# Patient Record
Sex: FEMALE | Race: WHITE
[De-identification: names, ages, dates, MRNs, and addresses within clinical notes are randomized per-mention and may not be internally consistent; named-entity substitution may affect disease eponyms.]

---

## 2017-09-08 ENCOUNTER — HOSPITAL ENCOUNTER (OUTPATIENT)
Dept: HOSPITAL 75 - RAD | Age: 52
End: 2017-09-08
Attending: FAMILY MEDICINE
Payer: COMMERCIAL

## 2017-09-08 DIAGNOSIS — M25.572: Primary | ICD-10-CM

## 2017-09-08 DIAGNOSIS — M79.672: Primary | ICD-10-CM

## 2017-09-08 PROCEDURE — 73610 X-RAY EXAM OF ANKLE: CPT

## 2017-09-08 PROCEDURE — 73620 X-RAY EXAM OF FOOT: CPT

## 2017-09-08 NOTE — DIAGNOSTIC IMAGING REPORT
INDICATION: Left foot injury.



AP and lateral views of the left foot show no fracture,

dislocation, or other acute abnormalities.



IMPRESSION: Negative left foot.



Dictated by: 



  Dictated on workstation # [id number]

## 2017-09-08 NOTE — DIAGNOSTIC IMAGING REPORT
INDICATION: Left ankle pain.



3 views of the left ankle show no fracture, dislocation, or other

acute abnormalities.



IMPRESSION: Negative left ankle.



Dictated by: 



  Dictated on workstation # [id number]

## 2017-10-06 ENCOUNTER — HOSPITAL ENCOUNTER (OUTPATIENT)
Dept: HOSPITAL 75 - RAD | Age: 52
End: 2017-10-06
Attending: FAMILY MEDICINE
Payer: COMMERCIAL

## 2017-10-06 DIAGNOSIS — N63.11: Primary | ICD-10-CM

## 2017-10-06 PROCEDURE — 77066 DX MAMMO INCL CAD BI: CPT

## 2017-10-06 NOTE — DIAGNOSTIC IMAGING REPORT
Bilateral CC and right MLO projections diagnostic mammogram.



INDICATION: Area of fullness in the central lateral aspect of the

right breast and in the axillary region.



The current study was also evaluated with a Computer Aided

Detection (CAD) system.



COMPARISON: 11/12/13.



FINDINGS:

There are bilateral retropectoral implants seen in a symmetric

fashion. There is no mass, architectural distortion or suspicious

cluster of calcification seen.



IMPRESSION: No mammographic evidence of malignancy. Ultrasound

evaluation pending. 



ACR BI-RADS Category 0: Incomplete. (Needs additional imaging

evaluation).

Result letter will be mailed to the patient.

Note: At least 10% of breast cancer is not imaged by mammography.



Dictated by: 



  Dictated on workstation # [id number]

## 2017-10-06 NOTE — DIAGNOSTIC IMAGING REPORT
Right breast ultrasound.



INDICATION: Thickening in the axilla and outer aspect of the

right breast.



FINDINGS: The area of thickening in the right breast at 9 o'clock

zone, 3 cm from the nipple area corresponds to a hypoechoic

nonspecific lesion with circumscribed margins and no internal

vascularity seen measuring 8 x 3 x 7 mm abutting the outer

surface of the implant. Etiology is uncertain. However, the

overall appearance and smooth margins is in favor of a benign

etiology and could possibly related to area of scarring after the

mastectomy and implant placement. The area of thickening in the

axilla was also scanned with no underlying abnormality.



IMPRESSION: Nonspecific 8 mm smoothly marginated hyperechoic

lesion corresponding to the palpable area in the outer aspect of

the right breast is favored to be benign, possibly a postsurgical

scar. Followup with ultrasound in 6 months is recommended to

ensure no adverse development. 



ACR BI-RADS Category 3: Probably benign findings.



Dictated by: 



  Dictated on workstation # [id number]

## 2017-10-27 ENCOUNTER — HOSPITAL ENCOUNTER (OUTPATIENT)
Dept: HOSPITAL 75 - RAD | Age: 52
End: 2017-10-27
Attending: FAMILY MEDICINE
Payer: COMMERCIAL

## 2017-10-27 DIAGNOSIS — N36.9: Primary | ICD-10-CM

## 2017-10-27 DIAGNOSIS — K40.90: ICD-10-CM

## 2017-10-27 PROCEDURE — 74177 CT ABD & PELVIS W/CONTRAST: CPT

## 2017-10-27 NOTE — DIAGNOSTIC IMAGING REPORT
PROCEDURE: CT abdomen and pelvis with contrast.



TECHNIQUE: Multiple contiguous axial images were obtained through

the abdomen and pelvis after administration of intravenous

contrast. 



INDICATION:  Hematuria. Pelvic pain.



100 mL of Omnipaque 350 is administered intravenously.



FINDINGS:

The lung bases appear clear. Breast implants are also partially

visualized.



The liver demonstrates diffuse steatosis with no focal mass

identified. The spleen is not enlarged. Cholecystectomy clips are

seen. The adrenal glands and the pancreas appear unremarkable.

The kidneys have symmetric enhancement and excretion. There is no

hydronephrosis. The urinary bladder appears unremarkable.



There is no significant free fluid or fluid collection in the

abdomen or pelvis noted. There is suggestion of hysterectomy.

Correlate with surgical history. There are prominent densities in

the urethra. These could relate to a urethral diverticula

containing stones. The appearance is similar to study from

4/18/2013.



The abdominal aorta is normal in caliber. No para-aortic

significantly enlarged lymph node is seen.



No bowel obstruction. A few small diverticula are seen in the

sigmoid colon with no evidence of diverticulitis. The appendix is

not seen on this exam.



There is a tiny indirect left-sided fat-containing inguinal

hernia.



The osseous structures appear grossly unremarkable.



IMPRESSION:

1. High-density material is surrounding the urethra similar to

2013 exam. These could relate to stones within urethral

diverticulum or possibly sequela to prior injury or surgery to

the urethra. Correlate with history.

2. Tiny fat-containing indirect left inguinal hernia.



Dictated by: 



  Dictated on workstation # [id number]

## 2017-11-15 ENCOUNTER — HOSPITAL ENCOUNTER (OUTPATIENT)
Dept: HOSPITAL 75 - RAD | Age: 52
End: 2017-11-15
Attending: UROLOGY
Payer: COMMERCIAL

## 2017-11-15 DIAGNOSIS — N20.0: Primary | ICD-10-CM

## 2017-11-15 PROCEDURE — 74000: CPT

## 2017-11-15 NOTE — DIAGNOSTIC IMAGING REPORT
INDICATION: Left renal calculus.



COMPARISON: CT dated 10/27/2017.



FINDINGS: Two frontal radiographic views of the abdomen were

obtained. Small bowel loops are nondistended. There is no large

collection of free intraperitoneal air. No unexpected radiopaque

foreign bodies are seen. There are bulky calcifications

projecting over the lower pelvis, midline. This corresponds to

dystrophic calcifications seen on previously performed CT.

Otherwise, no unexpected calculi are identified. Bony structures

show no gross acute abnormalities.



IMPRESSION:

1. Nonobstructive small bowel gas pattern.



Dictated by: 



  Dictated on workstation # [id number]

## 2018-02-12 ENCOUNTER — HOSPITAL ENCOUNTER (OUTPATIENT)
Dept: HOSPITAL 75 - RAD | Age: 53
End: 2018-02-12
Attending: NURSE PRACTITIONER
Payer: COMMERCIAL

## 2018-02-12 DIAGNOSIS — R05: Primary | ICD-10-CM

## 2018-02-12 PROCEDURE — 71046 X-RAY EXAM CHEST 2 VIEWS: CPT

## 2018-02-12 NOTE — DIAGNOSTIC IMAGING REPORT
CLINICAL INDICATION: Patient with cough for past month.



EXAM: Chest x-ray PA and lateral views.



COMPARISONS: Chest x-ray dated 01/23/2014.



FINDINGS:



Lungs/pleura: Lungs are clear. There is no pneumothorax. There is

no pleural effusion.



Mediastinum: Unremarkable. 



Pulmonary vasculature: Unremarkable.



Heart: Unremarkable.



Bones/extrathoracic soft tissue: Unremarkable.



IMPRESSION:

There is no radiographic evidence of acute cardiopulmonary

process.



Dictated by: 



  Dictated on workstation # [id number]

## 2018-02-26 ENCOUNTER — HOSPITAL ENCOUNTER (OUTPATIENT)
Dept: HOSPITAL 75 - RAD | Age: 53
End: 2018-02-26
Attending: FAMILY MEDICINE
Payer: COMMERCIAL

## 2018-02-26 DIAGNOSIS — K22.4: ICD-10-CM

## 2018-02-26 DIAGNOSIS — K21.9: Primary | ICD-10-CM

## 2018-02-26 PROCEDURE — 74240 X-RAY XM UPR GI TRC 1CNTRST: CPT

## 2018-02-26 NOTE — DIAGNOSTIC IMAGING REPORT
INDICATION: 

Gastroesophageal reflux disease.



TECHNIQUE: 

The patient ingested effervescent crystals as well as thin and

thick barium and imaging of the esophagus, stomach, and proximal

small bowel was performed. 1 minute and 50 seconds of fluoroscopy

time was utilized.



FINDINGS:

The preprocedure chest is unremarkable. The heart size is normal.

The lungs are clear. The esophagus has a smooth contour. No mass

or stricture is identified. No gastroesophageal reflux is

demonstrated. No significant hiatal hernia is seen. There is some

mild generalized esophageal dysmotility. The stomach has a normal

configuration. There is prompt emptying into the small bowel. The

duodenal bulb is without deformity.



IMPRESSION: 

Generalized esophageal dysmotility. No other significant

abnormality is seen.



Dictated by: 



  Dictated on workstation # [id number]

## 2018-04-12 ENCOUNTER — HOSPITAL ENCOUNTER (OUTPATIENT)
Dept: HOSPITAL 75 - RAD | Age: 53
End: 2018-04-12
Attending: FAMILY MEDICINE
Payer: COMMERCIAL

## 2018-04-12 DIAGNOSIS — N64.59: ICD-10-CM

## 2018-04-12 DIAGNOSIS — Z98.82: ICD-10-CM

## 2018-04-12 DIAGNOSIS — N63.11: Primary | ICD-10-CM

## 2018-04-12 NOTE — DIAGNOSTIC IMAGING REPORT
INDICATION: Rash on the right breast.



Comparison is made with prior exam from 10/06/2017 and

11/12/2013.



The current study was also evaluated with a Computer Aided

Detection (CAD) system.



Subpectoral right breast implant is noted. Implant contours

remain smooth. No definite evidence of extracapsular rupture is

seen. No mass or malignant appearing microcalcifications are

identified.



IMPRESSION: BI-RADS zero



Stable right mammogram. No suspicious abnormality is seen.

Patient is scheduled to undergo right breast ultrasound to

further evaluate previously noted nodule at the 9 o'clock

location.



ACR BI-RADS Category 0: Incomplete. (Needs additional imaging

evaluation).

Result letter will be mailed to the patient.

Note: At least 10% of breast cancer is not imaged by mammography.



Dictated by: 



  Dictated on workstation # [id number]

## 2018-04-12 NOTE — DIAGNOSTIC IMAGING REPORT
INDICATION: Right breast lump. Patient presents for six-month

followup.



Correlation is made with prior exam from 10/06/2017.



Sonographic interrogation of the 9 o'clock location of the right

breast, 3 cm from the nipple was performed. The mixed

echogenicity ovoid area adjacent to the implant remains stable at

7 mm x 3 mm x 8 mm. This remains indeterminate but most likely

represents scarring. No suspicious features are seen.



IMPRESSION: BI-RADS category 2



Stable right breast ultrasound. Patient should return in 6 months

for bilateral screening mammography.



ACR BI-RADS Category 2: Benign findings.



Dictated by: 



  Dictated on workstation # [id number]

## 2018-11-07 ENCOUNTER — HOSPITAL ENCOUNTER (OUTPATIENT)
Dept: HOSPITAL 75 - PREOP | Age: 53
Discharge: HOME | End: 2018-11-07
Attending: SURGERY
Payer: COMMERCIAL

## 2018-11-07 VITALS — WEIGHT: 169 LBS | HEIGHT: 62 IN | BODY MASS INDEX: 31.1 KG/M2

## 2018-11-07 DIAGNOSIS — Z01.818: Primary | ICD-10-CM

## 2018-11-14 ENCOUNTER — HOSPITAL ENCOUNTER (OUTPATIENT)
Dept: HOSPITAL 75 - ENDO | Age: 53
Discharge: HOME | End: 2018-11-14
Attending: SURGERY
Payer: COMMERCIAL

## 2018-11-14 VITALS — SYSTOLIC BLOOD PRESSURE: 125 MMHG | DIASTOLIC BLOOD PRESSURE: 75 MMHG

## 2018-11-14 VITALS — WEIGHT: 169 LBS | HEIGHT: 62 IN | BODY MASS INDEX: 31.1 KG/M2

## 2018-11-14 VITALS — SYSTOLIC BLOOD PRESSURE: 111 MMHG | DIASTOLIC BLOOD PRESSURE: 62 MMHG

## 2018-11-14 VITALS — SYSTOLIC BLOOD PRESSURE: 117 MMHG | DIASTOLIC BLOOD PRESSURE: 68 MMHG

## 2018-11-14 DIAGNOSIS — K21.0: ICD-10-CM

## 2018-11-14 DIAGNOSIS — K64.1: ICD-10-CM

## 2018-11-14 DIAGNOSIS — Z86.718: ICD-10-CM

## 2018-11-14 DIAGNOSIS — Z79.82: ICD-10-CM

## 2018-11-14 DIAGNOSIS — K44.9: ICD-10-CM

## 2018-11-14 DIAGNOSIS — Z12.11: Primary | ICD-10-CM

## 2018-11-14 DIAGNOSIS — Z79.899: ICD-10-CM

## 2018-11-14 DIAGNOSIS — K29.70: ICD-10-CM

## 2018-11-14 DIAGNOSIS — E06.3: ICD-10-CM

## 2018-11-14 DIAGNOSIS — E78.00: ICD-10-CM

## 2018-11-14 RX ADMIN — SODIUM CHLORIDE PRN MLS/HR: 900 INJECTION, SOLUTION INTRAVENOUS at 11:20

## 2018-11-14 RX ADMIN — SODIUM CHLORIDE PRN MLS/HR: 900 INJECTION, SOLUTION INTRAVENOUS at 09:40

## 2018-11-14 NOTE — PROGRESS NOTE-POST OPERATIVE
Post-Operative Progess Note


Surgeon (s)/Assistant (s)


Surgeon


MARINO KENNEDY MD


Assistant:  none





Pre-Operative Diagnosis


GERD, dysphagia, screening colonoscopy





Post-Operative Diagnosis





reflux esophagitis(stage 2), small-moderate hiatal hernia(2-2.5cm), mild


gastritis.


chronic stage 2 ext and int hemorrhoids.





Procedure & Operative Findings


Date of Procedure


11/14/18


Procedure Performed/Findings


EGD with bx.


Colonoscopy.


Anesthesia Type


CS





Estimated Blood Loss


Estimated blood loss (mL):  minimal





Specimens/Packing


Specimens Removed


GE jxn, antrum











MARINO KENNEDY MD Nov 14, 2018 11:55 am

## 2018-11-14 NOTE — PROGRESS NOTE-PRE OPERATIVE
Pre-Operative Progress Note


H&P Reviewed


The H&P was reviewed, patient examined and no changes noted.


Date Seen by Provider:  Nov 14, 2018


Time Seen by Provider:  10:00


Date H&P Reviewed:  Nov 14, 2018


Time H&P Reviewed:  10:00


Pre-Operative Diagnosis:  GERD, dysphagia, screening colonoscopy











MARINO KENNEDY MD Nov 14, 2018 11:53 am

## 2018-11-14 NOTE — OPERATIVE REPORT
DATE OF SERVICE:  11/14/2018



ATTENDING PHYSICIAN:

Michael Díaz MD.



PREOPERATIVE DIAGNOSES:

Gastroesophageal reflux disease, dysphagia, and screening colonoscopy.



POSTOPERATIVE DIAGNOSES:

Reflux esophagitis, stage II; small to moderate size hiatal hernia,

approximately 2 cm to 2.5 cm in size; and mild gastritis.  No distal

obstructions.  Chronic stage II external and internal hemorrhoids.  Remainder of

the rectum and colon were normal.



PROCEDURES PERFORMED:

Esophagogastroduodenoscopy with biopsy, colonoscopy.



SURGEON:

Marino Kennedy MD.



ANESTHESIA:

Conscious sedation.



ESTIMATED BLOOD LOSS:

Minimal.



FINDINGS:

1.  EGD, reflux esophagitis, stage II, no ulcers or strictures, small to

moderate size hiatal hernia, 2 cm to 2.5 cm in size.  There was a mild severity

gastritis.  No formal ulcerations, polyps or any neoplasms.  Pylorus and

duodenum appeared normal with no distal obstructions.

2.  Colonoscopy, chronic stage II external and internal hemorrhoids, not

actively edematous nor inflamed and no bleeding.  The remainder of the rectum

and colon were normal.



DISPOSITION:

The patient tolerated the procedure well.



INDICATIONS:

The patient is a 53-year-old female with epigastric pain as well as dysphagia. 

She reports that she has had a history of crampy pain after taking in a food

bolus and then would feel a pressure sensation and this would eventually resolve

on its own.  She does not report any regurgitation or jose guadalupe episodes of nausea,

no vomiting.  She states that this was initially very infrequent; however, has

become more frequent over time.  She also has noticed one episode of dark stools

in 04/2018, which resolved on its own.  She has not had a colonoscopy up to this

point in her life.  She reports for the most part, she is having normal bowel

movements and does not report any red blood per rectum.



DESCRIPTION OF PROCEDURE:

The patient was brought to the operating room, laid supine on the table.  After

adequate IV pain and sedating medications and conscious sedation anesthesia, the

mouthpiece was applied.



The endoscope was placed in the mouth, visualizing the pharynx and

hypopharyngeal region.  Vocal cords, epiglottis and vallecula identified and

appeared to be normal.  The endoscope was then gently intubated in the

esophageal opening and esophagus insufflated.  The endoscope was then advanced

through the first, second and third portion of the esophagus at the level of GE

junction, a reflux esophagitis, stage II identified.  There were no ulcerations

or strictures identified in this region.  The GE junction appeared to be

intrathoracic consistent with a hiatal hernia as well.



The endoscope was then advanced in the stomach and endoscope retroflexed,

visualizing a hiatal hernia, which was small to moderate size approximately 2 cm

to 2.5 cm in size.  A mild severity gastritis was noted.  There were no formal

ulcerations, polyps or any neoplasms.  A biopsy was taken of the stomach antrum

with forceps with visualization of good hemostasis.  The endoscope was then

advanced to the pylorus and the first and second portion of the duodenum, which

appeared normal.  No distal obstructions.



The patient tolerated this portion of the procedure well.  We will await the

biopsy results and await medical management.  We feel that the etiology of her

dysphagia is secondary to the hiatal hernia.  If she continues to be

symptomatic, we will recommend further evaluation for possible hiatal hernia

repair, which would encompass an esophageal manometry study to rule out any

esophageal dysmotility disorders.  In the meantime, we will continue with an

acid reduction therapy with Protonix and have her take in small and more

frequent meals, avoidance of eating at night as well as head elevation while

lying supine and she also needs to avoid caffeinated beverages, spicy, greasy

and acidic foods.



Under the same anesthesia, we then proceeded with the colonoscopy portion of

procedure.  A digital rectal examination was performed, which revealed chronic

stage II external and internal hemorrhoids, not actively edematous nor inflamed

and no bleeding.  Normal sphincter tone was felt and there were no palpable

masses.



The endoscope was then intubated into the anus and rectum gently insufflated. 

The endoscope was then advanced to the valves of [place] of the rectum with no

polyps or any neoplasms identified.  We then proceeded through the sigmoid colon

where no diverticulosis identified.  The endoscope was then advanced through the

remainder of the descending, transverse and ascending colon to the cecum.  These

segments were normal as well.  There were no polyps or any neoplasms identified.

 The endoscope was then slowly withdrawn while taking a second look and

suctioning of residual air with no additional findings.



The patient tolerated the procedure well.  We will recommend continued medical

management with a high fiber diet with at least 25 grams of fiber per day as

well as 64 fluid ounces of water daily to promote soft stools on a daily basis. 

She does not need another colonoscopy for another 10 years.





Job ID: [id number]

DocumentID: [id number]

Dictated Date:  11/14/2018 11:50:30

Transcription Date: 11/14/2018 16:16:35

Dictated By: MARINO KENNEDY MD

## 2018-11-14 NOTE — DISCHARGE INST-SURGICAL
D/C Lap Instructions-KIDO


New, Converted, or Re-Newed RX:  RX on Chart


Follow Up Appt in 2 weeks





Activity as tolerated





High Fiber Diet 25g or more per day





Avoid Alcohol, Caffeine, Spicy Greasy and Acid foods.





Drink 64 fluid oz or more of fluids per day.





Symptoms to Report: Fever over 101 degree F, Nausea/Vomiting 


If any problems/questions: Contact your physician or go to Emergency Room











MARINO KENNEDY MD Nov 14, 2018 11:57 am

## 2018-11-14 NOTE — CONSCIOUS SEDATION/ASA
Conscious Sedation Pre-Proced


Time


10:00





ASA Score


2


For ASA 3 and 4: Consider anesthesia and medical clearance. Also, for 

patients with a history of failed moderate sedation consider anesthesia.

















Airway 


 


Lungs 


 


Heart 


 


 ASA score


 


 ASA 1: a normal healthy patient


 


 ASA 2:  a patient with a mild systemic disease (mid diabetes, controlled 

hypertension, obesity 


 


 ASA 3:  a patient with a severe systemic disease that limits activity  (angina

, COPD, prior Myocardial infarction)


 


 ASA 4:  a patient with an incapacitating disease that is a constant threat to 

life (CHF, renal failure)


 


 ASA 5:  a moribund patient not expected to survive 24 hrs.  (ruptured aneurysm)


 


 ASA 6:  a declared brain dead patient whose organs are being harvested.


 


 For emergent operations, add the letter E after the classification











Mallampati Classification


Grade 2





Sedation Plan


Analgesia, Amnesia, Plan communicated to team members, Discussed options with 

patient/fam, Discussed risks with patient/fam


The patient is an appropriate candidate to undergo the planned procedure, 

sedation, and anesthesia.





The patient immediately re-assessed prior to indication.











MARINO KENNEDY MD Nov 14, 2018 11:52 am

## 2018-12-20 ENCOUNTER — HOSPITAL ENCOUNTER (OUTPATIENT)
Dept: HOSPITAL 75 - PREOP | Age: 53
Discharge: HOME | End: 2018-12-20
Attending: SURGERY
Payer: COMMERCIAL

## 2018-12-20 VITALS — HEIGHT: 62 IN | BODY MASS INDEX: 31.1 KG/M2 | WEIGHT: 169 LBS

## 2018-12-20 DIAGNOSIS — Z01.818: Primary | ICD-10-CM

## 2018-12-21 ENCOUNTER — HOSPITAL ENCOUNTER (OUTPATIENT)
Dept: HOSPITAL 75 - SDC | Age: 53
Discharge: HOME | End: 2018-12-21
Attending: SURGERY
Payer: COMMERCIAL

## 2018-12-21 VITALS — DIASTOLIC BLOOD PRESSURE: 60 MMHG | SYSTOLIC BLOOD PRESSURE: 107 MMHG

## 2018-12-21 VITALS — BODY MASS INDEX: 31.18 KG/M2 | HEIGHT: 62 IN | WEIGHT: 169.44 LBS

## 2018-12-21 VITALS — DIASTOLIC BLOOD PRESSURE: 62 MMHG | SYSTOLIC BLOOD PRESSURE: 106 MMHG

## 2018-12-21 VITALS — SYSTOLIC BLOOD PRESSURE: 99 MMHG | DIASTOLIC BLOOD PRESSURE: 57 MMHG

## 2018-12-21 VITALS — SYSTOLIC BLOOD PRESSURE: 134 MMHG | DIASTOLIC BLOOD PRESSURE: 72 MMHG

## 2018-12-21 DIAGNOSIS — Z79.82: ICD-10-CM

## 2018-12-21 DIAGNOSIS — F41.9: ICD-10-CM

## 2018-12-21 DIAGNOSIS — G57.92: ICD-10-CM

## 2018-12-21 DIAGNOSIS — K22.0: ICD-10-CM

## 2018-12-21 DIAGNOSIS — K44.9: ICD-10-CM

## 2018-12-21 DIAGNOSIS — K29.70: ICD-10-CM

## 2018-12-21 DIAGNOSIS — K21.0: ICD-10-CM

## 2018-12-21 DIAGNOSIS — Z79.899: ICD-10-CM

## 2018-12-21 DIAGNOSIS — K43.2: ICD-10-CM

## 2018-12-21 DIAGNOSIS — E06.3: ICD-10-CM

## 2018-12-21 DIAGNOSIS — K40.90: Primary | ICD-10-CM

## 2018-12-21 LAB
BASOPHILS # BLD AUTO: 0 10^3/UL (ref 0–0.1)
BASOPHILS NFR BLD AUTO: 0 % (ref 0–10)
EOSINOPHIL # BLD AUTO: 0.1 10^3/UL (ref 0–0.3)
EOSINOPHIL NFR BLD AUTO: 1 % (ref 0–10)
ERYTHROCYTE [DISTWIDTH] IN BLOOD BY AUTOMATED COUNT: 13.9 % (ref 10–14.5)
HCT VFR BLD CALC: 38 % (ref 35–52)
HGB BLD-MCNC: 12.7 G/DL (ref 11.5–16)
LYMPHOCYTES # BLD AUTO: 1.9 X 10^3 (ref 1–4)
LYMPHOCYTES NFR BLD AUTO: 37 % (ref 12–44)
MANUAL DIFFERENTIAL PERFORMED BLD QL: NO
MCH RBC QN AUTO: 30 PG (ref 25–34)
MCHC RBC AUTO-ENTMCNC: 33 G/DL (ref 32–36)
MCV RBC AUTO: 90 FL (ref 80–99)
MONOCYTES # BLD AUTO: 0.5 X 10^3 (ref 0–1)
MONOCYTES NFR BLD AUTO: 10 % (ref 0–12)
NEUTROPHILS # BLD AUTO: 2.6 X 10^3 (ref 1.8–7.8)
NEUTROPHILS NFR BLD AUTO: 52 % (ref 42–75)
PLATELET # BLD: 284 10^3/UL (ref 130–400)
PMV BLD AUTO: 10.2 FL (ref 7.4–10.4)
RBC # BLD AUTO: 4.24 10^6/UL (ref 4.35–5.85)
WBC # BLD AUTO: 5.1 10^3/UL (ref 4.3–11)

## 2018-12-21 PROCEDURE — 88112 CYTOPATH CELL ENHANCE TECH: CPT

## 2018-12-21 PROCEDURE — 85025 COMPLETE CBC W/AUTO DIFF WBC: CPT

## 2018-12-21 PROCEDURE — 36415 COLL VENOUS BLD VENIPUNCTURE: CPT

## 2018-12-21 PROCEDURE — 94664 DEMO&/EVAL PT USE INHALER: CPT

## 2018-12-21 PROCEDURE — 87081 CULTURE SCREEN ONLY: CPT

## 2018-12-21 PROCEDURE — 88305 TISSUE EXAM BY PATHOLOGIST: CPT

## 2018-12-21 RX ADMIN — SODIUM CHLORIDE, SODIUM LACTATE, POTASSIUM CHLORIDE, AND CALCIUM CHLORIDE PRN MLS/HR: 600; 310; 30; 20 INJECTION, SOLUTION INTRAVENOUS at 11:30

## 2018-12-21 RX ADMIN — SODIUM CHLORIDE, SODIUM LACTATE, POTASSIUM CHLORIDE, AND CALCIUM CHLORIDE PRN MLS/HR: 600; 310; 30; 20 INJECTION, SOLUTION INTRAVENOUS at 09:55

## 2018-12-21 NOTE — PROGRESS NOTE-PRE OPERATIVE
Pre-Operative Progress Note


H&P Reviewed


The H&P was reviewed, patient examined and no changes noted.


Date Seen by Provider:  Dec 21, 2018


Time Seen by Provider:  09:45


Date H&P Reviewed:  Dec 21, 2018


Time H&P Reviewed:  09:45


Pre-Operative Diagnosis:  reducible symptomatic incisional and left inguinal 

hernia.











MARINO KENNEDY MD Dec 21, 2018 09:48

## 2018-12-21 NOTE — CONSCIOUS SEDATION/ASA
Conscious Sedation Pre-Proced


Time


09:45





ASA Score


2


For ASA 3 and 4: Consider anesthesia and medical clearance. Also, for 

patients with a history of failed moderate sedation consider anesthesia.

















Airway 


 


Lungs 


 


Heart 


 


 ASA score


 


 ASA 1: a normal healthy patient


 


 ASA 2:  a patient with a mild systemic disease (mid diabetes, controlled 

hypertension, obesity 


 


 ASA 3:  a patient with a severe systemic disease that limits activity  (angina

, COPD, prior Myocardial infarction)


 


 ASA 4:  a patient with an incapacitating disease that is a constant threat to 

life (CHF, renal failure)


 


 ASA 5:  a moribund patient not expected to survive 24 hrs.  (ruptured aneurysm)


 


 ASA 6:  a declared brain dead patient whose organs are being harvested.


 


 For emergent operations, add the letter E after the classification











Mallampati Classification


Grade 2





Sedation Plan


Analgesia, Amnesia, Plan communicated to team members, Discussed options with 

patient/fam, Discussed risks with patient/fam


The patient is an appropriate candidate to undergo the planned procedure, 

sedation, and anesthesia.





The patient immediately re-assessed prior to indication.











MARINO KENNEDY MD Dec 21, 2018 09:45

## 2018-12-21 NOTE — PROGRESS NOTE-POST OPERATIVE
Post-Operative Progess Note


Surgeon (s)/Assistant (s)


Surgeon


MARINO KENNEDY MD


Assistant:  none





Pre-Operative Diagnosis


reducible symptomatic incisional and left inguinal hernia.





Post-Operative Diagnosis





same.





Procedure & Operative Findings


Date of Procedure


12/21/18


Procedure Performed/Findings


laparoscopic left indirect inguinal hernia repair with mesh.


incisional hernia repair.


EGD with bx and dilatation.


Anesthesia Type


GET





Estimated Blood Loss


Estimated blood loss (mL):  minimal





Specimens/Packing


Specimens Removed


GE jxn, antrum











MARINO KENNEDY MD Dec 21, 2018 12:38

## 2018-12-22 NOTE — OPERATIVE REPORT
DATE OF SERVICE:  12/21/2018



ATTENDING PRIMARY CARE PHYSICIAN:

Michael Díaz MD



PREOPERATIVE DIAGNOSES:

Symptomatic left inguinal hernia, symptomatic epigastric incisional hernia,

achalasia.



POSTOPERATIVE DIAGNOSES:

Small and indirect left inguinal hernia with preperitoneal fat within the hernia

sac.  Small incisional hernia from a previous trocar incision in the epigastric

region.  The ovaries appeared to be hypoplastic and solid in appearance.  An

intraoperative consultation was made with gynecology, which appeared to be

normal physiologic.  Reflux esophagitis stage II, achalasia, small hiatal

hernia, mild to moderate gastritis.



PROCEDURES:

1.  Laparoscopic left inguinal hernia repair with mesh.

2.  Laparoscopic ventral abdominal incisional hernia with mesh.

3.  EGD with biopsy.

4.  Balloon dilatation.



SURGEON:

Marino Kennedy MD



ANESTHESIA:

General endotracheal.



ESTIMATED BLOOD LOSS:

Minimal.



FINDINGS:

A small indirect left inguinal hernia with preperitoneal fat within the hernia

sac.  A small ventral abdominal incisional hernia from a previous trocar

placement.  Bilateral ovaries were apparent and appeared to be solid in

appearance.  An intraoperative consultation GYN was made, which appeared to be

normal physiologic changes.  Washings were sent for cytology.



DISPOSITION:

The patient tolerated the procedure well.



INDICATIONS:

The patient is a 53-year-old female with epigastric pain as well as dysphagia. 

She reports crampy abdominal pain after taking food bolus and fullness sensation
,

which were eventually resolved on its own.  This was initially infrequent;

however, worsened over time.  EGD was performed, which did show a hiatal hernia

approximately 2 cm in size as well as mild gastritis.  Biopsies were negative

for Flowers esophagus as well as H. pylori.  She eventually underwent an

esophageal manometry study, which showed normal waveform contractions of the

esophagus; however, increased tone of the lower esophageal sphincter for longer

periods of time more consistent with an achalasia.  She also has a symptomatic

left inguinal hernia, which is reducible; however, tender to palpation.  She

also reports of development of an incisional hernia in the epigastric region

from a previous trocar placement for laparoscopic surgery.  These were both

reducible; however, tender to palpation.



DESCRIPTION OF PROCEDURE:

The patient was brought to the operating room, laid supine on the table.  After

adequate IV pain and sedating medications, general endotracheal intubation, the

abdomen was prepped and draped in standard surgical fashion.



A 0.5% Marcaine with epinephrine was used to anesthetize the overlying skin in

the infraumbilical rim and a small transverse skin incision made using a #15

blade.  The abdominal wall was then retracted anteriorly and a Veress needle

inserted with low opening pressure of 0 mmHg and the abdomen was insufflated to

15 mmHg pressure.  The Veress needle removed and a 10 mm Xcel trocar placed

followed by a 10 mm 45-degree angle laparoscope visualizing the peritoneal

cavity.



A 4-quadrant abdominal exploration was performed.  There was a small left

inguinal hernia identified, which appeared to be an indirect hernia.  There was

no right hernia component identified.  There was a small incisional hernia

identified in the epigastric region as well.  The patient is status post

hysterectomy in 2006 and she has bilateral ovary cysts as well as tubes.  These

appeared to be white and solid in appearance and it was decided to proceed with

intraoperative consultation with GYN.  However, the report was that these were

normal physiologic changes for age.  Washings were sent for cytology in the

pelvis and bilateral ovaries.  We then proceeded to place bilateral 5 mm ports

under direct visualization after the skin and peritoneal lining were

anesthetized using 0.5% Marcaine with epinephrine and a transverse skin incision

made using a #15 blade.



The patient was then placed in Trendelenburg position.  Wedge of mesentery was

then opened using Sonicision and we first proceeded with lateral dissection

towards the conjoined tendon and inguinal ligament.  We then proceeded medially

towards the conjoint to the Rodrigo's ligament.  We then proceeded with inferior

dissection taking the hernia sac as well as the preperitoneal fat within the

defect.  Good hemostasis was observed and a medium sized Bard 3DMax

polypropylene mesh was placed through the 10 mm port and tacked to Rodrigo's

ligament medially and conjoint tendon laterally.  The peritoneal lining was then

placed over the mesh and a few tacks placed to hold it in place with

visualization of good hemostasis.



We then turned our attention to repair of the small ventral abdominal incisional

hernia in the epigastric region.  There was preperitoneal fat within the hernia

sac, which was then dissected out.  Through this defect, a 4.3 mm circular Bard

polypropylene mesh was placed and a few absorbable tacks placed into the fascia

to hold it into place.  Good hemostasis was observed.



The 10 mm port site fascia and peritoneum were then closed under direct

visualization using a Red-Gail device and 0 Vicryl suture.  The abdomen

was desufflated and the remaining ports removed.  All skin incisions were closed

using 4-0 Monocryl running subcuticular sutures.  Wounds were then cleaned and

covered with Dermabond.



We then proceeded with EGD and dilatation portion of the procedure.  The

mouthpiece was applied.  The endoscope was placed in the mouth, visualizing the

pharynx and hypopharyngeal region.  Vocal cords, epiglottis and vallecula

identified and appeared to be normal.  The endoscope was then gently intubated

at the esophageal opening and esophagus insufflated.  The endoscope was then

advanced to the first, second and third portion of the esophagus.  At the level

of the GE junction, a reflux esophagitis stage II identified.  There were no

ulcers or strictures identified in this region; however, there did appear to be

hypertonicity of the lower esophageal sphincter for an extended period of time

consistent with achalasia.  A biopsy was taken of the GE junction using forceps

with visualization of good hemostasis.



The endoscope was then easily advanced into the stomach and the endoscope

retroflexed again visualizing a small hiatal hernia.  This measurement was more

appropriately approximately 1.5 cm in size.  There was a mild to moderate

gastritis.  No ulcers, polyps or any neoplasms identified.  A biopsy was taken

of the antrum with visualization of good hemostasis.  The endoscope was then

advanced to the pylorus into the first and second portion of the duodenum, which

appeared normal with no distal obstructions.  



A balloon catheter was then placed near the GE junction and insufflated 

to 2 atmosphere of pressure with minimal resisitance. We then increased 

to 4 then 6 atmospheres with mild to moderate resistance then left this 

in place for 60 seconds. The balloon was then desufflated and removed with 

no bleeding nor mucocal tears. The endoscope was then slowly

withdrawn while taking a second look and suctioning of residual air with no

additional findings.



The patient tolerated the procedure well.  We will recommend the necessary

lifestyle and diet accommodation including small and more frequent meals,

avoidance of eating at night as well as head elevation while lying supine.  She

also needs to avoid caffeinated beverages, spicy, greasy and acidic foods.  If

she becomes symptomatic again, we will proceed with repeat dilatations.  We will

have followup in the office in approximately 2 weeks.





Job ID: [id number]

DocumentID: [id number]

Dictated Date:  12/21/2018 12:55:26

Transcription Date: 12/22/2018 00:25:11

Dictated By: MARINO KENNEDY MD

MTDD

## 2018-12-22 NOTE — CONSULTATION REPORT
DATE OF SERVICE:  12/21/2018



This is an intraoperative consult from Dr. Ronal Baum.



REASON FOR CONSULTATION:

Evaluate the patient's ovaries.



HISTORY OF PRESENT ILLNESS:

The patient is a 53-year-old white female patient of Dr. Villeda undergoing

laparoscopic surgery at his hands.  In the course of that surgery, he evaluated

her pelvis identifying the ovaries.  The ovaries were suspended well up on each

side of the pelvis with a portion of the fallopian tube obscuring complete

visualization of the ovary.  The portion of the ovary that was visible seemed 
to be

suspicious appearing.  I was consulted intraoperatively to evaluate the patient'
s

ovaries.



At the time of the consultation, which was shortly after 1 p.m. on this date,

the patient was asleep in the operating room with laparoscopic ports in place

with Dr. Villeda scrubbed in.  He was finished with his laparoscopic procedure and

had asked that I inspect this patient's ovaries.  He directed the laparoscope to

the patient's left ovary, which was partially covered by normal appearing

fallopian tube.  The ovary was well suspended up on the left pelvic brim. 

The ovary had a normal and somewhat atretic appearance.  There was no abnormal

appearing pathology visually.  The laparoscope was rotated to the right pelvic

side wall.  Likewise, the right ovary was adherent to the pelvic side wall from

its suspension at the time of this patient's hysterectomy.



This patient was well known to me as I had performed her hysterectomy.  
Hysterectomy was a total abdominal hysterectomy with conservation of both 
ovaries.  At the time of that surgery I would have supported the ovaries well 
up on the pelvic sidewall at or near the pelvic brim as was seen by Dr. Villeda.  
So the position of the ovaries was as to be expected. This patient at 53 is 
likely menopausal, which would be consistent with the atretic appearance of her 
ovaries.



Dr. Villeda had obtained pelvic washings and nothing further was warranted in 
regard to evaluation of the ovaries or the pelvis at this time.  Again, my 
recommendation was to leave the ovaries as is, as they appeared perfectly 
normal.





Job ID: [id number]

DocumentID: [id number]

Dictated Date:  12/22/2018 09:51:26

Transcription Date: 12/22/2018 10:54:05

Dictated By: JACKIE BOBO MD

MTDD

## 2019-04-22 ENCOUNTER — HOSPITAL ENCOUNTER (EMERGENCY)
Dept: HOSPITAL 75 - ER | Age: 54
Discharge: HOME | End: 2019-04-22
Payer: COMMERCIAL

## 2019-04-22 VITALS — SYSTOLIC BLOOD PRESSURE: 128 MMHG | DIASTOLIC BLOOD PRESSURE: 72 MMHG

## 2019-04-22 VITALS — WEIGHT: 170 LBS | HEIGHT: 62 IN | BODY MASS INDEX: 31.28 KG/M2

## 2019-04-22 DIAGNOSIS — Z90.710: ICD-10-CM

## 2019-04-22 DIAGNOSIS — G43.909: ICD-10-CM

## 2019-04-22 DIAGNOSIS — F32.9: ICD-10-CM

## 2019-04-22 DIAGNOSIS — E06.3: ICD-10-CM

## 2019-04-22 DIAGNOSIS — Z90.89: ICD-10-CM

## 2019-04-22 DIAGNOSIS — Z82.49: ICD-10-CM

## 2019-04-22 DIAGNOSIS — J45.909: ICD-10-CM

## 2019-04-22 DIAGNOSIS — Z90.13: ICD-10-CM

## 2019-04-22 DIAGNOSIS — N13.2: Primary | ICD-10-CM

## 2019-04-22 DIAGNOSIS — Z79.82: ICD-10-CM

## 2019-04-22 DIAGNOSIS — Z87.19: ICD-10-CM

## 2019-04-22 DIAGNOSIS — D64.9: ICD-10-CM

## 2019-04-22 DIAGNOSIS — Z88.0: ICD-10-CM

## 2019-04-22 DIAGNOSIS — Z96.653: ICD-10-CM

## 2019-04-22 DIAGNOSIS — F41.9: ICD-10-CM

## 2019-04-22 DIAGNOSIS — Z91.040: ICD-10-CM

## 2019-04-22 DIAGNOSIS — Z90.49: ICD-10-CM

## 2019-04-22 DIAGNOSIS — Z91.041: ICD-10-CM

## 2019-04-22 LAB
ALBUMIN SERPL-MCNC: 4.5 GM/DL (ref 3.2–4.5)
ALP SERPL-CCNC: 62 U/L (ref 40–136)
ALT SERPL-CCNC: 21 U/L (ref 0–55)
AMORPH SED URNS QL MICRO: (no result) /LPF
AMYLASE SERPL-CCNC: 63 U/L (ref 25–125)
APTT PPP: YELLOW S
BACTERIA #/AREA URNS HPF: (no result) /HPF
BASOPHILS # BLD AUTO: 0 10^3/UL (ref 0–0.1)
BASOPHILS NFR BLD AUTO: 0 % (ref 0–10)
BILIRUB SERPL-MCNC: 0.3 MG/DL (ref 0.1–1)
BILIRUB UR QL STRIP: NEGATIVE
BUN/CREAT SERPL: 13
CALCIUM SERPL-MCNC: 10 MG/DL (ref 8.5–10.1)
CHLORIDE SERPL-SCNC: 113 MMOL/L (ref 98–107)
CO2 SERPL-SCNC: 22 MMOL/L (ref 21–32)
CREAT SERPL-MCNC: 1.2 MG/DL (ref 0.6–1.3)
EOSINOPHIL # BLD AUTO: 0.1 10^3/UL (ref 0–0.3)
EOSINOPHIL NFR BLD AUTO: 2 % (ref 0–10)
ERYTHROCYTE [DISTWIDTH] IN BLOOD BY AUTOMATED COUNT: 13.5 % (ref 10–14.5)
FIBRINOGEN PPP-MCNC: (no result) MG/DL
GFR SERPLBLD BASED ON 1.73 SQ M-ARVRAT: 47 ML/MIN
GLUCOSE SERPL-MCNC: 104 MG/DL (ref 70–105)
GLUCOSE UR STRIP-MCNC: NEGATIVE MG/DL
HCT VFR BLD CALC: 40 % (ref 35–52)
HGB BLD-MCNC: 13.2 G/DL (ref 11.5–16)
KETONES UR QL STRIP: NEGATIVE
LEUKOCYTE ESTERASE UR QL STRIP: (no result)
LIPASE SERPL-CCNC: 23 U/L (ref 8–78)
LYMPHOCYTES # BLD AUTO: 2.3 X 10^3 (ref 1–4)
LYMPHOCYTES NFR BLD AUTO: 46 % (ref 12–44)
MANUAL DIFFERENTIAL PERFORMED BLD QL: NO
MCH RBC QN AUTO: 30 PG (ref 25–34)
MCHC RBC AUTO-ENTMCNC: 33 G/DL (ref 32–36)
MCV RBC AUTO: 91 FL (ref 80–99)
MONOCYTES # BLD AUTO: 0.4 X 10^3 (ref 0–1)
MONOCYTES NFR BLD AUTO: 7 % (ref 0–12)
NEUTROPHILS # BLD AUTO: 2.3 X 10^3 (ref 1.8–7.8)
NEUTROPHILS NFR BLD AUTO: 45 % (ref 42–75)
NITRITE UR QL STRIP: NEGATIVE
PH UR STRIP: 7 [PH] (ref 5–9)
PLATELET # BLD: 322 10^3/UL (ref 130–400)
PMV BLD AUTO: 9.6 FL (ref 7.4–10.4)
POTASSIUM SERPL-SCNC: 3.7 MMOL/L (ref 3.6–5)
PROT SERPL-MCNC: 7.3 GM/DL (ref 6.4–8.2)
PROT UR QL STRIP: (no result)
RBC #/AREA URNS HPF: (no result) /HPF
SODIUM SERPL-SCNC: 145 MMOL/L (ref 135–145)
SP GR UR STRIP: 1.01 (ref 1.02–1.02)
UROBILINOGEN UR-MCNC: NORMAL MG/DL
WBC # BLD AUTO: 5.1 10^3/UL (ref 4.3–11)
WBC #/AREA URNS HPF: (no result) /HPF

## 2019-04-22 PROCEDURE — 82150 ASSAY OF AMYLASE: CPT

## 2019-04-22 PROCEDURE — 85025 COMPLETE CBC W/AUTO DIFF WBC: CPT

## 2019-04-22 PROCEDURE — 80053 COMPREHEN METABOLIC PANEL: CPT

## 2019-04-22 PROCEDURE — 74176 CT ABD & PELVIS W/O CONTRAST: CPT

## 2019-04-22 PROCEDURE — 87088 URINE BACTERIA CULTURE: CPT

## 2019-04-22 PROCEDURE — 81000 URINALYSIS NONAUTO W/SCOPE: CPT

## 2019-04-22 PROCEDURE — 36415 COLL VENOUS BLD VENIPUNCTURE: CPT

## 2019-04-22 PROCEDURE — 74018 RADEX ABDOMEN 1 VIEW: CPT

## 2019-04-22 PROCEDURE — 83690 ASSAY OF LIPASE: CPT

## 2019-04-22 NOTE — DIAGNOSTIC IMAGING REPORT
PROCEDURE: CT urinary tract, rule out kidney stone.



TECHNIQUE: Multiple contiguous axial images were obtained through

the abdomen and pelvis without the use of intravenous contrast.

Auto Exposure Controls were utilized during the CT exam to meet

ALARA standards for radiation dose reduction. 



INDICATION: Right flank pain with hematuria.



COMPARISON: Plain film performed earlier the same day and to

prior CT study from 10/27/2017.



FINDINGS:  

The lung bases appear clear without infiltrate or evidence of an

effusion. There are bilateral breast implants.



The liver demonstrates no evidence of a focal intrahepatic

abnormality. The patient is status post cholecystectomy. There is

no abnormal biliary dilatation. The pancreas demonstrates no

focal abnormality. The spleen is normal in size. There is no

adrenal mass.



There are tiny non-obstructive stones within the left kidney

measuring approximately 2-3 mm. On the right, there is moderate

right-sided hydronephrosis. There is also right-sided

hydroureter. This appears secondary to a stone at the right

ureteral vesicular junction which measures approximately 6 mm.



Density within the urethra is unchanged from prior CT examination

and may reflect stones within the urethral diverticulum.



The bowel appears nonobstructed. There is moderate stool

throughout the colon. There is no abnormal bowel thickening.

There is no focal inflammation within the omentum or mesentery.



There are no findings of free air or free fluid or abscess. There

are no pathologically enlarged lymph nodes. There are mild

atherosclerotic calcifications within a normal caliber aorta. No

acute or suspicious osseous abnormality is evident within the

visualized portion of the spine or pelvis.



IMPRESSION:

1. Moderate right-sided hydronephrosis with associated

hydroureter secondary to a 6 mm stone at the right

ureterovesicular junction.

2. Nonobstructive small calculi within the left kidney.

3. Previous cholecystectomy and hysterectomy.

4. No evidence of bowel obstruction, free air or free fluid.

5. Unchanged density at the level of the urethra may reflect

stones within a urethral diverticulum.









Dictated by: 



  Dictated on workstation # [id number]

## 2019-04-22 NOTE — ED BACK PAIN
General


Chief Complaint:  Back Problems


Stated Complaint:  LOWER BACK PAIN


Nursing Triage Note:  


PT AMB TO ROOM #4 HOLDING BACK. A&OX4. C/O RT FLANK PAIN RADIATING LOWER MEDIAL 


BACK. PT REPORTS @ APPROX 1830 ON THIS DAY, STABBING PAIN TO RT FLANK BEGAN. PT 


REPORTS BURNING UPON URINATION. PT STATES, "ON WEDNESDAY I PASSED SOME BLACKISH 


MARQUIS PARTICLES IN MY URINE." REPORTS TO HAVE TAKEN A PRESCRIBED 7.5MG 


HYDROCODONE PTA WITH NO RELIEF. DENIES RECENT FEVER OR CHILLS. PT NOTED TO BE 


TEARFUL DURING TRIAGE.


Nursing Sepsis Screen:  No Definite Risk


Source of Information:  Patient


Exam Limitations:  No Limitations





History of Present Illness


Date Seen by Provider:  Apr 22, 2019


Time Seen by Provider:  20:50





Allergies and Home Medications


Allergies


Coded Allergies:  


     Penicillins (Verified  Allergy, Unknown, RASH, 11/7/18)


     erythromycin base (Verified  Allergy, Unknown, 4/4/06)


     latex (Verified  Allergy, Unknown, 11/14/18)





Home Medications


Alprazolam 1 Mg Tablet, 1 MG PO BID PRN for ANXIETY, (Reported)


Aspirin 81 Mg Tab.chew, 81 MG PO DAILY, (Reported)


Baclofen 20 Mg Tablet, 20 MG PO BID, (Reported)


Fenofibrate,Micronized 200 Mg Capsule, 200 MG PO DAILY, (Reported)


Fluoxetine HCl 40 Mg Capsule, 40 MG PO DAILY, (Reported)


Hydrocodone Bit/Acetaminophen 1 Tab Tab, 1 TAB PO Q6H PRN for PAIN-MODERATE, (

Reported)


Hydrocodone Bit/Acetaminophen 1 Ea Tablet, 1 EACH PO Q4H PRN for PAIN-MODERATE


   Prescribed by: MARINO KENNEDY on 12/21/18 0952


Pantoprazole Sodium 40 Mg Tablet.dr, 40 MG PO DAILY


   Prescribed by: MARINO KENNEDY on 11/14/18 1156


Rosuvastatin Calcium 10 Mg Tablet, 10 MG PO DAILY, (Reported)


Sulfamethoxazole/Trimethoprim 1 Each Tablet, 1 EACH PO BID


   Prescribed by: LEI LÓPEZ on 4/22/19 2218


Thyroid,Pork 60 Mg Tablet, 60 MG PO DAILY, (Reported)


Topiramate 100 Mg Tablet, 100 MG PO BID, (Reported)


Zolpidem Tartrate 10 Mg Tablet, 10 MG PO HS PRN for SLEEP, (Reported)





Past Medical-Social-Family Hx


Patient Social History


Alcohol Use:  Denies Use


Recreational Drug Use:  No


Smoking Status:  Never a Smoker


2nd Hand Smoke Exposure:  No


Recent Foreign Travel:  No


Contact w/Someone Who Travel:  No


Recent Infectious Disease Expo:  No


Recent Hopitalizations:  No





Immunizations Up To Date


Tetanus Booster (TDap):  Less than 5yrs


PED Vaccines UTD:  No


Date of Pneumonia Vaccine:  Jan 24, 2014


Date of Influenza Vaccine:  Oct 1, 2018





Seasonal Allergies


Seasonal Allergies:  Yes





Past Medical History


Surgeries:  Yes (c/s x2, bilat TKR, bilat mastectomy, Left leg fem pop, R CTR)


Appendectomy, Bladder Surgery, Gallbladder, Hysterectomy, Tonsillectomy


Respiratory:  Yes


Asthma


Cardiac:  Yes


Heart Murmur


Neurological:  Yes (NERVE DAMAGE IN L FOOT)


Headaches /Migraines


Reproductive Disorders:  No


Female Reproductive Disorders:  Denies


GYN History:  Hysterectomy


Sexually Transmitted Disease:  No


HIV/AIDS:  No


Gastrointestinal:  Yes (bile salt diarrhea)


Abdominal Hernia


Musculoskeletal:  Yes (LEFT AND RIGHT PARTIAL KNEE REPLACEMENTS)


Degenerate Disk Disease, Arthritis, Fibromyalgia, Chronic Back Pain


Endocrine:  Yes (hashimoto thyroiditis)


Cancer:  No


Psychosocial:  Yes


Anxiety, Depression


Integumentary:  Yes


Psoriasis


Blood Disorders:  Yes (anemia)


Adverse Reaction/Blood Tranf:  No





Family Medical History





Alcoholism


  09 SISTER


Cancer


  03 MOTHER


Family history: Breast disease


  03 MOTHER


Family history: Diabetes mellitus


  03 MOTHER


Family history: Hypertension


  03 MOTHER





Physical Exam


Vital Signs





Vital Signs - First Documented








 4/22/19





 20:48


 


Temp 98.3


 


Pulse 80


 


Resp 16


 


B/P (MAP) 147/79 (101)


 


Pulse Ox 99


 


O2 Delivery Room Air





Capillary Refill : Less Than 3 Seconds


Height, Weight, BMI


Height: 5'2.00"


Weight: 170lbs. 7.0oz. [id number]; 31.0 BMI


Method:Stated





Progress/Results/Core Measures


Results/Orders


Lab Results





Laboratory Tests








Test


 4/22/19


21:05 Range/Units


 


 


White Blood Count


 5.1 


 4.3-11.0


10^3/uL


 


Red Blood Count


 4.38 


 4.35-5.85


10^6/uL


 


Hemoglobin 13.2  11.5-16.0  G/DL


 


Hematocrit 40  35-52  %


 


Mean Corpuscular Volume 91  80-99  FL


 


Mean Corpuscular Hemoglobin 30  25-34  PG


 


Mean Corpuscular Hemoglobin


Concent 33 


 32-36  G/DL





 


Red Cell Distribution Width 13.5  10.0-14.5  %


 


Platelet Count


 322 


 130-400


10^3/uL


 


Mean Platelet Volume 9.6  7.4-10.4  FL


 


Neutrophils (%) (Auto) 45  42-75  %


 


Lymphocytes (%) (Auto) 46 H 12-44  %


 


Monocytes (%) (Auto) 7  0-12  %


 


Eosinophils (%) (Auto) 2  0-10  %


 


Basophils (%) (Auto) 0  0-10  %


 


Neutrophils # (Auto) 2.3  1.8-7.8  X 10^3


 


Lymphocytes # (Auto) 2.3  1.0-4.0  X 10^3


 


Monocytes # (Auto) 0.4  0.0-1.0  X 10^3


 


Eosinophils # (Auto)


 0.1 


 0.0-0.3


10^3/uL


 


Basophils # (Auto)


 0.0 


 0.0-0.1


10^3/uL


 


Urine Color YELLOW   


 


Urine Clarity


 SLIGHTLY


CLOUDY  





 


Urine pH 7  5-9  


 


Urine Specific Gravity 1.015 L 1.016-1.022  


 


Urine Protein 2+ H NEGATIVE  


 


Urine Glucose (UA) NEGATIVE  NEGATIVE  


 


Urine Ketones NEGATIVE  NEGATIVE  


 


Urine Nitrite NEGATIVE  NEGATIVE  


 


Urine Bilirubin NEGATIVE  NEGATIVE  


 


Urine Urobilinogen NORMAL  NORMAL  MG/DL


 


Urine Leukocyte Esterase 1+ H NEGATIVE  


 


Urine RBC (Auto) 3+ H NEGATIVE  


 


Urine RBC 5-10 H  /HPF


 


Urine WBC 2-5   /HPF


 


Urine Squamous Epithelial


Cells 10-25 H


  /HPF





 


Urine Crystals PRESENT H  /LPF


 


Urine Amorphous Sediment


 MOD GEORGE


PHOSPHATE H  /LPF





 


Urine Bacteria MODERATE H  /HPF


 


Urine Casts NONE   /LPF


 


Urine Mucus NEGATIVE   /LPF


 


Urine Culture Indicated YES   


 


Sodium Level 145  135-145  MMOL/L


 


Potassium Level 3.7  3.6-5.0  MMOL/L


 


Chloride Level 113 H   MMOL/L


 


Carbon Dioxide Level 22  21-32  MMOL/L


 


Anion Gap 10  5-14  MMOL/L


 


Blood Urea Nitrogen 16  7-18  MG/DL


 


Creatinine


 1.20 


 0.60-1.30


MG/DL


 


Estimat Glomerular Filtration


Rate 47 


  





 


BUN/Creatinine Ratio 13   


 


Glucose Level 104    MG/DL


 


Calcium Level 10.0  8.5-10.1  MG/DL


 


Corrected Calcium 9.6  8.5-10.1  MG/DL


 


Total Bilirubin 0.3  0.1-1.0  MG/DL


 


Aspartate Amino Transf


(AST/SGOT) 22 


 5-34  U/L





 


Alanine Aminotransferase


(ALT/SGPT) 21 


 0-55  U/L





 


Alkaline Phosphatase 62    U/L


 


Total Protein 7.3  6.4-8.2  GM/DL


 


Albumin 4.5  3.2-4.5  GM/DL


 


Amylase Level 63    U/L


 


Lipase 23  8-78  U/L








My Orders





Orders - LEI LÓPEZ


Comprehensive Metabolic Panel (4/22/19 20:55)


Lipase (4/22/19 20:55)


Amylase (4/22/19 20:55)


Ua Culture If Indicated (4/22/19 20:55)


Ed Iv/Invasive Line Start (4/22/19 20:55)


Cbc With Automated Diff (4/22/19 20:55)


Ct Abd/Pelvis Wo(Kidney Stone) (4/22/19 20:55)


Abdomen/Kub 1view (4/22/19 20:55)


Ketorolac Injection (Toradol Injection) (4/22/19 21:00)


Fentanyl  Injection (Sublimaze Injection (4/22/19 21:00)


Ondansetron Injection (Zofran Injectio (4/22/19 21:00)


Ns Iv 1000 Ml (Sodium Chloride 0.9%) (4/22/19 21:00)


Urine Culture (4/22/19 21:05)


Fentanyl  Injection (Sublimaze Injection (4/22/19 22:15)





Medications Given in ED





Current Medications








 Medications  Dose


 Ordered  Sig/Kim


 Route  Start Time


 Stop Time Status Last Admin


Dose Admin


 


 Fentanyl Citrate  50 mcg  ONCE  ONCE


 IVP  4/22/19 21:00


 4/22/19 21:01 DC 4/22/19 21:08


50 MCG


 


 Ketorolac


 Tromethamine  30 mg  ONCE  ONCE


 IVP  4/22/19 21:00


 4/22/19 21:01 DC 4/22/19 21:08


30 MG


 


 Ondansetron HCl  4 mg  ONCE  ONCE


 IVP  4/22/19 21:00


 4/22/19 21:01 DC 4/22/19 21:08


4 MG








Vital Signs/I&O











 4/22/19





 20:48


 


Temp 98.3


 


Pulse 80


 


Resp 16


 


B/P (MAP) 147/79 (101)


 


Pulse Ox 99


 


O2 Delivery Room Air














Blood Pressure Mean:  101











Departure


Impression





 Primary Impression:  


 Urolithiasis


 Qualified Codes:  N20.0 - Calculus of kidney


Disposition:  01 HOME, SELF-CARE


Condition:  Stable/Unchanged





Departure-Patient Inst.


Decision time for Depature:  22:16


Referrals:  


BRENDA BROWN MD (PCP/Family)


Primary Care Physician








TAWIL,ELIAS A MD


Patient Instructions:  Kidney Stones (DC)





Add. Discharge Instructions:  


Take medications as directed. Use your home pain medication as needed. Call Dr. Tawil's office tomorrow morning to schedule an appointment time for further 

evaluation. Strain all urine and if you should pass the stone take it with you 

to Dr. Tawil's office. Return back to the emergency room for worsening symptoms 

or concerns as needed. All discharge instructions reviewed with patient and/or 

family. Voiced understanding.


Scripts


Sulfamethoxazole/Trimethoprim (Bactrim Ds Tablet) 1 Each Tablet


1 EACH PO BID for 7 Days, #14 TAB


   Prov: LEI LÓPEZ         4/22/19











LEI LÓPEZ Apr 22, 2019 22:18

## 2019-04-22 NOTE — XMS REPORT
Continuity of Care Document

 Created on: 2019



HUBER FUNEZ

External Reference #: [id number]

: 1965

Sex: Female



Demographics







 Address  [address] [place]  [address]

 

 Home Phone  [phone] x

 

 Preferred Language  Unknown

 

 Marital Status  Unknown

 

 Scientologist Affiliation  Unknown

 

 Race  Unknown

 

 Ethnic Group  Unknown





Author







 Organization  Unknown

 

 Address  Unknown

 

 Phone  [phone]



              



Allergies

      





 Active            Description            Code            Type            
Severity            Reaction            Onset            Reported/Identified   
         Relationship to Patient            Clinical Status        

 

 Yes            cephalexin            [id number]            Drug Allergy       
     Unknown            N/A                         2006                 
                 

 

 Yes            erythromycin base            [id number]            Drug Allergy
            Unknown            N/A                         2018          
                        

 

 Yes            latex            [id number]            Drug Allergy            
Unknown            N/A                         2018                      
            

 

 Yes            Penicillins            [id number]            Drug Allergy      
      Unknown            RASH                         2018               
                   



                        



Medications

      



There is no data.                  



Problems

      





 Date Dx Coded            Attending            Type            Code            
Diagnosis            Diagnosed By        

 

 2010                         Ot            041.7            PSEUDOMONAS 
INFECT NOS                     

 

 2010                         Ot            444.22            LOWER 
EXTREMITY EMBOLISM                     

 

 2010                         Ot            909.3            LATE EFF SURG
/MED COMPL                     

 

 2010                         Ot            998.51            INFECTED 
POSTOP SEROMA                     

 

 2010                         Ot            E849.7            ACCID IN 
RESIDENT INSTIT                     

 

 2010                         Ot            E870.0            ACC CUT/HEM 
IN SURGERY                     

 

 2010                         Ot            V43.65            KNEE JOINT 
REPLACEMENT STATUS                     

 

 06/10/2010                         Ot            041.7                        
          

 

 06/10/2010                         Ot            780.60                       
           

 

 2010                         Ot            444.22                       
           

 

 2010                         Ot            997.2                        
          

 

 2010                         Ot            285.1            AC 
POSTHEMORRHAG ANEMIA                     

 

 2010                         Ot            493.90            ASTHMA, 
UNSPECIFIED                     

 

 2010                         Ot            715.96            
OSTEOARTHROS NOS-L/LEG                     

 

 2010                         Ot            996.74            OTH COMPL 
DUE TO OTH VASCULAR DEVICE,IMP                     

 

 10/07/2010                         Ot            V43.65                       
           

 

 10/07/2010                         Ot            V54.81                       
           

 

 10/07/2010                         Ot            V57.1                        
          

 

 10/27/2010                         Ot            444.22                       
           

 

 10/27/2010                         Ot            493.90                       
           

 

 10/27/2010                         Ot            716.90                       
           

 

 10/27/2010                         Ot            729.1                        
          

 

 10/27/2010                         Ot            V16.3                        
          

 

 10/27/2010                         Ot            V16.41                       
           

 

 10/27/2010                         Ot            V58.61                       
           

 

 10/27/2010                         Ot            V58.69                       
           

 

 2011                         Ot            444.22            LOWER 
EXTREMITY EMBOLISM                     

 

 2011                         Ot            493.90            ASTHMA, 
UNSPECIFIED                     

 

 2011                         Ot            716.90            ARTHROPATHY 
NOS-UNSPEC                     

 

 2011                         Ot            729.1            MYALGIA AND 
MYOSITIS NOS                     

 

 2011                         Ot            V16.3            FAMILY HX-
BREAST MALIG                     

 

 2011                         Ot            V16.41            FAM HX-MAL 
NEOP-OVARY                     

 

 2011                         Ot            V58.61            
ANTICOAGULANTS,LT,CURRENT USE                     

 

 2011                         Ot            V58.69            OTH MED,LT,
CURRENT USE                     

 

 2012                         Ot            285.9            ANEMIA NOS  
                   

 

 2012                         Ot            V58.61            
ANTICOAGULANTS,LT,CURRENT USE                     

 

 2012                         Ot            285.9            ANEMIA NOS  
                   

 

 2012                         Ot            V58.61            
ANTICOAGULANTS,LT,CURRENT USE                     

 

 2012                         Ot            285.9            ANEMIA NOS  
                   

 

 2012                         Ot            V16.3            FAMILY HX-
BREAST MALIG                     

 

 2012                         Ot            V16.41            FAM HX-MAL 
NEOP-OVARY                     

 

 2012                         Ot            V58.61            
ANTICOAGULANTS,LT,CURRENT USE                     

 

 2013            RISA MCKEON MD            Ot            368.2      
      DIPLOPIA                     

 

 2013            RISA MCKEON MD            Ot            780.4      
      DIZZINESS AND GIDDINESS                     

 

 10/08/2013            STEPHY COLE MD            Ot            723.0        
    CERVICAL SPINAL STENOSIS                     

 

 10/08/2013            STEPHY COLE MD            Ot            724.1        
    PAIN IN THORACIC SPINE                     

 

 10/08/2013            STEPHY COLE MD            Ot            724.2        
    LUMBAGO                     

 

 10/08/2013            STEPHY COLE MD            Ot            V43.65       
     KNEE JOINT REPLACEMENT STATUS                     

 

 10/08/2013            STEPHY COLE MD            Ot            V57.1        
    PHYSICAL THERAPY NEC                     

 

 2014            STEPHY COLE MD            Ot            276.8        
    HYPOPOTASSEMIA                     

 

 2014            STEPHY COLE MD            Ot            285.9        
    ANEMIA NOS                     

 

 2014            STEPHY COLE MD            Ot            487.1        
    FLU W RESP MANIFEST NEC                     

 

 2014            STEPHY COLE MD            Ot            V03.82       
     PROPHYLACTIC VACC AGAINST STREPTOCOCCUS                      

 

 2015                         Ot            V76.12                       
           

 

 2015                         Ot            440.20                       
           

 

 2015                         Ot            440.4                        
          

 

 2015                         Ot            V72.63                       
           

 

 2015                         Ot            V72.81                       
           

 

 2015                         Ot            V74.8                        
          

 

 2015                         Ot            285.9                        
          

 

 2015                         Ot            V58.61                       
           

 

 2015                         Ot            444.22                       
           

 

 2015                         Ot            493.90                       
           

 

 2015                         Ot            716.90                       
           

 

 2015                         Ot            729.1                        
          

 

 2015                         Ot            V16.3                        
          

 

 2015                         Ot            V16.41                       
           

 

 2015                         Ot            V58.61                       
           

 

 2015                         Ot            996.74                       
           

 

 2015                         Ot            444.22                       
           

 

 2015                         Ot            493.90                       
           

 

 2015                         Ot            716.90                       
           

 

 2015                         Ot            729.1                        
          

 

 2015                         Ot            V16.3                        
          

 

 2015                         Ot            V16.41                       
           

 

 2015                         Ot            V58.61                       
           

 

 2015                         Ot            V58.69                       
           

 

 2015                         Ot            V76.12                       
           

 

 2015                         Ot            288.50                       
           

 

 2015                         Ot            444.22                       
           

 

 2015                         Ot            493.90                       
           

 

 2015                         Ot            716.90                       
           

 

 2015                         Ot            729.1                        
          

 

 2015                         Ot            V16.3                        
          

 

 2015                         Ot            V16.41                       
           

 

 2015                         Ot            V58.61                       
           

 

 2015                         Ot            V58.69                       
           

 

 2015                         Ot            793.89                       
           

 

 2015                         Ot            V16.3                        
          

 

 2015                         Ot            611.72                       
           

 

 2015                         Ot            611.72                       
           

 

 2015                         Ot            V72.63                       
           

 

 2015                         Ot            V72.81                       
           

 

 2015                         Ot            V74.8                        
          

 

 2015                         Ot            729.5                        
          

 

 2015                         Ot            V12.51                       
           

 

 2015                         Ot            V16.3                        
          

 

 2015                         Ot            V76.12                       
           

 

 2015                         Ot            793.80                       
           

 

 2015                         Ot            V16.3                        
          

 

 2015                         Ot            286.9                        
          

 

 2015                         Ot            443.9                        
          

 

 2015                         Ot            V16.3                        
          

 

 2015                         Ot            V58.66                       
           

 

 2015                         Ot            V58.69                       
           

 

 2015                         Ot            443.9                        
          

 

 2015                         Ot            729.81                       
           

 

 2015                         Ot            782.3                        
          

 

 2015                         Ot            285.9                        
          

 

 2015                         Ot            V16.3                        
          

 

 2015                         Ot            V16.41                       
           

 

 2015                         Ot            V58.61                       
           

 

 2015                         Ot            786.50                       
           

 

 2015                         Ot            789.06                       
           

 

 2015                         Ot            786.50                       
           

 

 2015                         Ot            789.06                       
           

 

 2015                         Ot            V12.51                       
           

 

 2015                         Ot            V12.71                       
           

 

 2015                         Ot            V58.69                       
           

 

 2015                         Ot            789.05                       
           

 

 2015            DOUGLAS LARA, STEPHY DULCE            Ot            244.9        
                          

 

 2015            DOUGLAS LARA, \[facility]\""            Ot            336.9        
                          

 

 2015            DOUGLAS LARA, \[facility]\""            Ot            722.10       
                           

 

 2015            DOUGLAS LARA, \[facility]\""            Ot            722.11       
                           

 

 2015            DOUGLAS LARA, \[facility]\""            Ot            793.89       
                           

 

 2015            DOUGLAS LARA, \[facility]\""            Ot            V16.3        
                          

 

 2015            DOUGLAS LARA, \[facility]\""            Ot            244.9        
                          

 

 2015            DOUGLAS LARA, \[facility]\""            Ot            440.20       
                           

 

 2015            DOUGLAS LARA, \[facility]\""            Ot            440.4        
                          

 

 2015            DOUGLAS LARA, \[facility]\""            Ot            729.5        
                          

 

 2015            DOUGLAS LARA, \[facility]\""            Ot            V12.51       
                           

 

 2015            DOUGLAS LARA, \[facility]\""            Ot            729.5        
                          

 

 2015            DOUGLAS LARA, \[facility]\""            Ot            V12.51       
                           

 

 2015            DOUGLAS LARA, \[facility]\""            Ot            440.20       
                           

 

 2015            DOUGLAS LARA, \[facility]\""            Ot            440.4        
                          

 

 2015            DOUGLAS LARA, \[facility]\""            Ot            729.5        
                          

 

 2015            DOUGLAS LARA, \[facility]\""            Ot            V12.51       
                           

 

 2016                         Ot            444.22                       
           

 

 2016                         Ot            493.90                       
           

 

 2016                         Ot            716.90                       
           

 

 2016                         Ot            729.1                        
          

 

 2016                         Ot            V16.3                        
          

 

 2016                         Ot            V16.41                       
           

 

 2016                         Ot            V58.61                       
           

 

 2016                         Ot            V58.69                       
           

 

 2016                         Ot            V76.12                       
           

 

 2016                         Ot            288.50                       
           

 

 2016                         Ot            444.22                       
           

 

 2016                         Ot            493.90                       
           

 

 2016                         Ot            716.90                       
           

 

 2016                         Ot            729.1                        
          

 

 2016                         Ot            V16.3                        
          

 

 2016                         Ot            V16.41                       
           

 

 2016                         Ot            V58.61                       
           

 

 2016                         Ot            V58.69                       
           

 

 2016                         Ot            793.89                       
           

 

 2016                         Ot            V16.3                        
          

 

 2016                         Ot            611.72                       
           

 

 2016                         Ot            611.72                       
           

 

 2016                         Ot            V72.63                       
           

 

 2016                         Ot            V72.81                       
           

 

 2016                         Ot            V74.8                        
          

 

 2016                         Ot            729.5                        
          

 

 2016                         Ot            V12.51                       
           

 

 2016                         Ot            V16.3                        
          

 

 2016                         Ot            V76.12                       
           

 

 2016                         Ot            793.80                       
           

 

 2016                         Ot            V16.3                        
          

 

 2016                         Ot            286.9                        
          

 

 2016                         Ot            443.9                        
          

 

 2016                         Ot            V16.3                        
          

 

 2016                         Ot            V58.66                       
           

 

 2016                         Ot            V58.69                       
           

 

 2016                         Ot            443.9                        
          

 

 2016                         Ot            729.81                       
           

 

 2016                         Ot            782.3                        
          

 

 2016                         Ot            285.9                        
          

 

 2016                         Ot            V16.3                        
          

 

 2016                         Ot            V16.41                       
           

 

 2016                         Ot            V58.61                       
           

 

 2016                         Ot            786.50                       
           

 

 2016                         Ot            789.06                       
           

 

 2016                         Ot            786.50                       
           

 

 2016                         Ot            789.06                       
           

 

 2016                         Ot            V12.51                       
           

 

 2016                         Ot            V12.71                       
           

 

 2016                         Ot            V58.69                       
           

 

 2016                         Ot            789.05                       
           

 

 2016            DOUGLAS LARA, STEPHY CARDONA            Ot            244.9        
                          

 

 2016            DOUGLAS LARA, STEPHY CARDONA            Ot            336.9        
                          

 

 2016            DOUGLAS LARA, STEPHY CARDONA            Ot            722.10       
                           

 

 2016            DOUGLAS LARA, STEPHY CARDONA            Ot            722.11       
                           

 

 2016            DOUGLAS LARA, STEPHY CARDONA            Ot            793.89       
                           

 

 2016            DOUGLAS LARA, STEPHY CARDONA            Ot            V16.3        
                          

 

 2016            DOUGLAS LARA, STEPHY CARDONA            Ot            244.9        
                          

 

 2016            DOUGLAS LARA, STEPHY CARDONA            Ot            729.5        
                          

 

 2016            DOUGLAS LARA, STEPHY CARDONA            Ot            V12.51       
                           

 

 2016            DOUGLAS LARA, STEPHY CARDONA            Ot            440.20       
                           

 

 2016            DOUGLAS LARA, STEPHY CARDONA            Ot            440.4        
                          

 

 2016            DOUGLAS LARA, STEPHY CARDONA            Ot            729.5        
                          

 

 2016            DOUGLAS LARA, STEPHY CARDONA            Ot            V12.51       
                           

 

 2016                         Ot            444.22                       
           

 

 2016                         Ot            493.90                       
           

 

 2016                         Ot            716.90                       
           

 

 2016                         Ot            729.1                        
          

 

 2016                         Ot            V16.3                        
          

 

 2016                         Ot            V16.41                       
           

 

 2016                         Ot            V58.61                       
           

 

 2016                         Ot            V58.69                       
           

 

 2016                         Ot            V76.12                       
           

 

 2016                         Ot            288.50                       
           

 

 2016                         Ot            444.22                       
           

 

 2016                         Ot            493.90                       
           

 

 2016                         Ot            716.90                       
           

 

 2016                         Ot            729.1                        
          

 

 2016                         Ot            V16.3                        
          

 

 2016                         Ot            V16.41                       
           

 

 2016                         Ot            V58.61                       
           

 

 2016                         Ot            V58.69                       
           

 

 2016                         Ot            793.89                       
           

 

 2016                         Ot            V16.3                        
          

 

 2016                         Ot            611.72                       
           

 

 2016                         Ot            611.72                       
           

 

 2016                         Ot            V72.63                       
           

 

 2016                         Ot            V72.81                       
           

 

 2016                         Ot            V74.8                        
          

 

 2016                         Ot            729.5                        
          

 

 2016                         Ot            V12.51                       
           

 

 2016                         Ot            V16.3                        
          

 

 2016                         Ot            V76.12                       
           

 

 2016                         Ot            793.80                       
           

 

 2016                         Ot            V16.3                        
          

 

 2016                         Ot            286.9                        
          

 

 2016                         Ot            443.9                        
          

 

 2016                         Ot            V16.3                        
          

 

 2016                         Ot            V58.66                       
           

 

 2016                         Ot            V58.69                       
           

 

 2016                         Ot            443.9                        
          

 

 2016                         Ot            729.81                       
           

 

 2016                         Ot            782.3                        
          

 

 2016                         Ot            285.9                        
          

 

 2016                         Ot            V16.3                        
          

 

 2016                         Ot            V16.41                       
           

 

 2016                         Ot            V58.61                       
           

 

 2016                         Ot            786.50                       
           

 

 2016                         Ot            789.06                       
           

 

 2016                         Ot            786.50                       
           

 

 2016                         Ot            789.06                       
           

 

 2016                         Ot            V12.51                       
           

 

 2016                         Ot            V12.71                       
           

 

 2016                         Ot            V58.69                       
           

 

 2016                         Ot            789.05                       
           

 

 2016            DOUGLAS LARA, STEPHY CARDONA            Ot            244.9        
                          

 

 2016            DOUGLAS LARA, STEPHY CARDONA            Ot            336.9        
                          

 

 2016            DOUGLAS LARA, STEPHY CARDONA            Ot            722.10       
                           

 

 2016            DOUGLAS LARA, STEPHY CARDOAN            Ot            722.11       
                           

 

 2016            DOUGLAS LARA, STEPHY CARDONA            Ot            793.89       
                           

 

 2016            DOUGLAS LARA, STEPHY CARDONA            Ot            V16.3        
                          

 

 2016            DOUGLAS LARA, STEPHY CARDONA            Ot            244.9        
                          

 

 2016            DOUGLAS LARA, STEPHY CARDONA            Ot            729.5        
                          

 

 2016            DOUGLAS LARA, STEPHY CARDONA            Ot            V12.51       
                           

 

 2016            DOUGLAS LARA, STEPHY CARDONA            Ot            440.20       
                           

 

 2016            DOUGLAS LARA, STEPHY CARDONA            Ot            440.4        
                          

 

 2016            DOUGLAS LARA, STEPHY CARDONA            Ot            729.5        
                          

 

 2016            DOUGLAS LARA, STEPHY CARDONA            Ot            V12.51       
                           

 

 2016                         Ot            444.22            LOWER 
EXTREMITY EMBOLISM                     

 

 2016                         Ot            493.90            ASTHMA, 
UNSPECIFIED                     

 

 2016                         Ot            716.90            ARTHROPATHY 
NOS-UNSPEC                     

 

 2016                         Ot            729.1            MYALGIA AND 
MYOSITIS NOS                     

 

 2016                         Ot            V16.3            FAMILY HX-
BREAST MALIG                     

 

 2016                         Ot            V16.41            FAM HX-MAL 
NEOP-OVARY                     

 

 2016                         Ot            V58.61            
ANTICOAGULANTS,LT,CURRENT USE                     

 

 2016                         Ot            V58.69            OTH MED,LT,
CURRENT USE                     

 

 2016                         Ot            V76.12            OTH SCREEN 
MAMMO-MALIGN NEOPLASM OF AURY                     

 

 2016                         Ot            288.50            
LEUKOCYTOPENIA, UNSPECIFIED                     

 

 2016                         Ot            444.22            LOWER 
EXTREMITY EMBOLISM                     

 

 2016                         Ot            493.90            ASTHMA, 
UNSPECIFIED                     

 

 2016                         Ot            716.90            ARTHROPATHY 
NOS-UNSPEC                     

 

 2016                         Ot            729.1            MYALGIA AND 
MYOSITIS NOS                     

 

 2016                         Ot            V16.3            FAMILY HX-
BREAST MALIG                     

 

 2016                         Ot            V16.41            FAM HX-MAL 
NEOP-OVARY                     

 

 2016                         Ot            V58.61            
ANTICOAGULANTS,LT,CURRENT USE                     

 

 2016                         Ot            V58.69            OTH MED,LT,
CURRENT USE                     

 

 2016                         Ot            793.89            OTH (ABN) 
FINDINGS ON RADIOLOGICAL EXAMI                     

 

 2016                         Ot            V16.3            FAMILY HX-
BREAST MALIG                     

 

 2016                         Ot            611.72            LUMP OR 
MASS IN BREAST                     

 

 2016                         Ot            611.72            LUMP OR 
MASS IN BREAST                     

 

 2016                         Ot            V72.63            PRE-
PROCEDURAL LABORATORY EXAMINATION                     

 

 2016                         Ot            V72.81            EXAM-PRE-
OPERATIVE CARDIOVASCULAR                     

 

 2016                         Ot            V74.8            SCREEN-
BACTERIAL DIS NEC                     

 

 2016                         Ot            729.5            PAIN IN LIMB
                     

 

 2016                         Ot            V12.51            HX-VENOUS 
THROMBOSIS EMBOLISM                     

 

 2016                         Ot            V16.3            FAMILY HX-
BREAST MALIG                     

 

 2016                         Ot            V76.12            OTH SCREEN 
MAMMO-MALIGN NEOPLASM OF AURY                     

 

 2016                         Ot            793.80            UNSPEC 
ABNORMAL MAMMOGRAM                     

 

 2016                         Ot            V16.3            FAMILY HX-
BREAST MALIG                     

 

 2016                         Ot            286.9            COAGULAT 
DEFECT NEC/NOS                     

 

 2016                         Ot            443.9            PERIPH 
VASCULAR DIS NOS                     

 

 2016                         Ot            V16.3            FAMILY HX-
BREAST MALIG                     

 

 2016                         Ot            V58.66            LONG-TERM (
CURRENT) USE OF ASPIRIN                     

 

 2016                         Ot            V58.69            OTH MED,LT,
CURRENT USE                     

 

 2016                         Ot            443.9            PERIPH 
VASCULAR DIS NOS                     

 

 2016                         Ot            729.81            SWELLING OF 
LIMB                     

 

 2016                         Ot            782.3            EDEMA       
              

 

 2016                         Ot            285.9            ANEMIA NOS  
                   

 

 2016                         Ot            V16.3            FAMILY HX-
BREAST MALIG                     

 

 2016                         Ot            V16.41            FAM HX-MAL 
NEOP-OVARY                     

 

 2016                         Ot            V58.61            
ANTICOAGULANTS,LT,CURRENT USE                     

 

 2016                         Ot            786.50            CHEST PAIN 
NOS                     

 

 2016                         Ot            789.06            ABDOMINAL 
PAIN, EPIGASTRIC                     

 

 2016                         Ot            786.50            CHEST PAIN 
NOS                     

 

 2016                         Ot            789.06            ABDOMINAL 
PAIN, EPIGASTRIC                     

 

 2016                         Ot            V12.51            HX-VENOUS 
THROMBOSIS EMBOLISM                     

 

 2016                         Ot            V12.71            PERSONAL 
HISTORY OF PEPTIC ULCER DISEASE                     

 

 2016                         Ot            V58.69            OTH MED,LT,
CURRENT USE                     

 

 2016                         Ot            789.05            ABDOMINAL 
PAIN, PERIUMBILIC                     

 

 2016            STEPHY COLE MD            Ot            244.9        
    HYPOTHYROIDISM NOS                     

 

 2016            STEPHY COLE MD            Ot            336.9        
    SPINAL CORD DISEASE NOS                     

 

 2016            STEPHY COLE MD            Ot            722.10       
     LUMBAR DISC DISPLACEMENT                     

 

 2016            STEPHY COLE MD            Ot            722.11       
     THORACIC DISC DISPLACMNT                     

 

 2016            STEPHY COLE MD            Ot            793.89       
     OTH (ABN) FINDINGS ON RADIOLOGICAL EXAMI                     

 

 2016            STEPHY COLE MD            Ot            V16.3        
    FAMILY HX-BREAST MALIG                     

 

 2016            STEPHY COLE MD            Ot            244.9        
    HYPOTHYROIDISM NOS                     

 

 2016            STEPHY COLE MD            Ot            729.5        
    PAIN IN LIMB                     

 

 2016            STEPHY COLE MD            Ot            V12.51       
     HX-VENOUS THROMBOSIS EMBOLISM                     

 

 2016            STEPHY COLE MD            Ot            440.20       
     ATHEROSCLEROSIS NATIVE ARTERIES EXTREMIT                     

 

 2016            STEPHY COLE MD            Ot            440.4        
    CHRONIC TOTAL OCCLUSION OF ARTERY OF THE                     

 

 2016            STEPHY COLE MD            Ot            729.5        
    PAIN IN LIMB                     

 

 2016            STEPHY COLE MD            Ot            V12.51       
     HX-VENOUS THROMBOSIS EMBOLISM                     

 

 2017                         Ot            288.50            
LEUKOCYTOPENIA, UNSPECIFIED                     

 

 2017                         Ot            444.22            LOWER 
EXTREMITY EMBOLISM                     

 

 2017                         Ot            493.90            ASTHMA, 
UNSPECIFIED                     

 

 2017                         Ot            716.90            ARTHROPATHY 
NOS-UNSPEC                     

 

 2017                         Ot            729.1            MYALGIA AND 
MYOSITIS NOS                     

 

 2017                         Ot            V16.3            FAMILY HX-
BREAST MALIG                     

 

 2017                         Ot            V16.41            FAM HX-MAL 
NEOP-OVARY                     

 

 2017                         Ot            V58.61            
ANTICOAGULANTS,LT,CURRENT USE                     

 

 2017                         Ot            V58.69            OTH MED,LT,
CURRENT USE                     

 

 2017                         Ot            793.89            OTH (ABN) 
FINDINGS ON RADIOLOGICAL EXAMI                     

 

 2017                         Ot            V16.3            FAMILY HX-
BREAST MALIG                     

 

 2017                         Ot            611.72            LUMP OR 
MASS IN BREAST                     

 

 2017                         Ot            611.72            LUMP OR 
MASS IN BREAST                     

 

 2017                         Ot            V72.63            PRE-
PROCEDURAL LABORATORY EXAMINATION                     

 

 2017                         Ot            V72.81            EXAM-PRE-
OPERATIVE CARDIOVASCULAR                     

 

 2017                         Ot            V74.8            SCREEN-
BACTERIAL DIS NEC                     

 

 2017                         Ot            729.5            PAIN IN LIMB
                     

 

 2017                         Ot            V12.51            HX-VENOUS 
THROMBOSIS EMBOLISM                     

 

 2017                         Ot            V16.3            FAMILY HX-
BREAST MALIG                     

 

 2017                         Ot            V76.12            OTH SCREEN 
MAMMO-MALIGN NEOPLASM OF AURY                     

 

 2017                         Ot            793.80            UNSPEC 
ABNORMAL MAMMOGRAM                     

 

 2017                         Ot            V16.3            FAMILY HX-
BREAST MALIG                     

 

 2017                         Ot            286.9            COAGULAT 
DEFECT NEC/NOS                     

 

 2017                         Ot            443.9            PERIPH 
VASCULAR DIS NOS                     

 

 2017                         Ot            V16.3            FAMILY HX-
BREAST MALIG                     

 

 2017                         Ot            V58.66            LONG-TERM (
CURRENT) USE OF ASPIRIN                     

 

 2017                         Ot            V58.69            OTH MED,LT,
CURRENT USE                     

 

 2017                         Ot            443.9            PERIPH 
VASCULAR DIS NOS                     

 

 2017                         Ot            729.81            SWELLING OF 
LIMB                     

 

 2017                         Ot            782.3            EDEMA       
              

 

 2017                         Ot            285.9            ANEMIA NOS  
                   

 

 2017                         Ot            V16.3            FAMILY HX-
BREAST MALIG                     

 

 2017                         Ot            V16.41            FAM HX-MAL 
NEOP-OVARY                     

 

 2017                         Ot            V58.61            
ANTICOAGULANTS,LT,CURRENT USE                     

 

 2017                         Ot            786.50            CHEST PAIN 
NOS                     

 

 2017                         Ot            789.06            ABDOMINAL 
PAIN, EPIGASTRIC                     

 

 2017                         Ot            786.50            CHEST PAIN 
NOS                     

 

 2017                         Ot            789.06            ABDOMINAL 
PAIN, EPIGASTRIC                     

 

 2017                         Ot            V12.51            HX-VENOUS 
THROMBOSIS EMBOLISM                     

 

 2017                         Ot            V12.71            PERSONAL 
HISTORY OF PEPTIC ULCER DISEASE                     

 

 2017                         Ot            V58.69            OTH MED,LT,
CURRENT USE                     

 

 2017                         Ot            789.05            ABDOMINAL 
PAIN, PERIUMBILIC                     

 

 2017            DOUGLAS LARA, STEPHY CARDONA            Ot            244.9        
    HYPOTHYROIDISM NOS                     

 

 2017            DOUGLAS LARA, STEPHY CARDONA            Ot            336.9        
    SPINAL CORD DISEASE NOS                     

 

 2017            DOUGLAS LARA, STEPHY CARDONA            Ot            722.10       
     LUMBAR DISC DISPLACEMENT                     

 

 2017            STEPHY COLE MD            Ot            722.11       
     THORACIC DISC DISPLACMNT                     

 

 2017            STEPHY COLE MD            Ot            793.89       
     OTH (ABN) FINDINGS ON RADIOLOGICAL EXAMI                     

 

 2017            STEPHY COLE MD            Ot            V16.3        
    FAMILY HX-BREAST MALIG                     

 

 2017            STEPHY COLE MD            Ot            244.9        
    HYPOTHYROIDISM NOS                     

 

 2017            STEPHY COLE MD            Ot            729.5        
    PAIN IN LIMB                     

 

 2017            STEPHY COLE MD            Ot            V12.51       
     HX-VENOUS THROMBOSIS EMBOLISM                     

 

 2017            STEPHY COLE MD            Ot            440.20       
     ATHEROSCLEROSIS NATIVE ARTERIES EXTREMIT                     

 

 2017            STEPHY COLE MD            Ot            440.4        
    CHRONIC TOTAL OCCLUSION OF ARTERY OF THE                     

 

 2017            STEPHY COLE MD            Ot            729.5        
    PAIN IN LIMB                     

 

 2017            STEPHY COLE MD            Ot            V12.51       
     HX-VENOUS THROMBOSIS EMBOLISM                     

 

 2017            BRENDA BROWN MD            Ot            M79.672   
         PAIN IN LEFT FOOT                     

 

 2017            BRENDA BROWN MD            Ot            M79.672   
         PAIN IN LEFT FOOT                     

 

 2017            BRENDA BROWN MD            Ot            M25.572   
         PAIN IN LEFT ANKLE AND JOINTS OF LEFT FO                     

 

 2017            BRENDA BROWN MD            Ot            M79.672   
         PAIN IN LEFT FOOT                     

 

 10/18/2017            BRENDA BROWN MD            Ot            N63.11    
        UNSPECIFIED LUMP IN THE RIGHT BREAST, UP                     

 

 10/25/2017                         Ot            V16.3            FAMILY HX-
BREAST MALIG                     

 

 10/25/2017                         Ot            V76.12            OTH SCREEN 
MAMMO-MALIGN NEOPLASM OF AURY                     

 

 10/25/2017                         Ot            793.80            UNSPEC 
ABNORMAL MAMMOGRAM                     

 

 10/25/2017                         Ot            V16.3            FAMILY HX-
BREAST MALIG                     

 

 10/25/2017                         Ot            286.9            COAGULAT 
DEFECT NEC/NOS                     

 

 10/25/2017                         Ot            443.9            PERIPH 
VASCULAR DIS NOS                     

 

 10/25/2017                         Ot            V16.3            FAMILY HX-
BREAST MALIG                     

 

 10/25/2017                         Ot            V58.66            LONG-TERM (
CURRENT) USE OF ASPIRIN                     

 

 10/25/2017                         Ot            V58.69            OTH MED,LT,
CURRENT USE                     

 

 10/25/2017                         Ot            443.9            PERIPH 
VASCULAR DIS NOS                     

 

 10/25/2017                         Ot            729.81            SWELLING OF 
LIMB                     

 

 10/25/2017                         Ot            782.3            EDEMA       
              

 

 10/25/2017                         Ot            285.9            ANEMIA NOS  
                   

 

 10/25/2017                         Ot            V16.3            FAMILY HX-
BREAST MALIG                     

 

 10/25/2017                         Ot            V16.41            FAM HX-MAL 
NEOP-OVARY                     

 

 10/25/2017                         Ot            V58.61            
ANTICOAGULANTS,LT,CURRENT USE                     

 

 10/25/2017                         Ot            786.50            CHEST PAIN 
NOS                     

 

 10/25/2017                         Ot            789.06            ABDOMINAL 
PAIN, EPIGASTRIC                     

 

 10/25/2017                         Ot            786.50            CHEST PAIN 
NOS                     

 

 10/25/2017                         Ot            789.06            ABDOMINAL 
PAIN, EPIGASTRIC                     

 

 10/25/2017                         Ot            V12.51            HX-VENOUS 
THROMBOSIS EMBOLISM                     

 

 10/25/2017                         Ot            V12.71            PERSONAL 
HISTORY OF PEPTIC ULCER DISEASE                     

 

 10/25/2017                         Ot            V58.69            OTH MED,LT,
CURRENT USE                     

 

 10/25/2017                         Ot            789.05            ABDOMINAL 
PAIN, PERIUMBILIC                     

 

 10/25/2017            STEPHY COLE MD            Ot            244.9        
    HYPOTHYROIDISM NOS                     

 

 10/25/2017            DOUGLAS LARA, STEPHY CARDONA            Ot            336.9        
    SPINAL CORD DISEASE NOS                     

 

 10/25/2017            STEPHY COLE MD            Ot            722.10       
     LUMBAR DISC DISPLACEMENT                     

 

 10/25/2017            STEPHY COLE MD            Ot            722.11       
     THORACIC DISC DISPLACMNT                     

 

 10/25/2017            STEPHY COLE MD            Ot            793.89       
     OTH (ABN) FINDINGS ON RADIOLOGICAL EXAMI                     

 

 10/25/2017            STEPHY COLE MD            Ot            V16.3        
    FAMILY HX-BREAST MALIG                     

 

 10/25/2017            STEPHY COLE MD            Ot            244.9        
    HYPOTHYROIDISM NOS                     

 

 10/25/2017            STEPHY COLE MD            Ot            729.5        
    PAIN IN LIMB                     

 

 10/25/2017            STEPHY COLE MD            Ot            V12.51       
     HX-VENOUS THROMBOSIS EMBOLISM                     

 

 10/25/2017            STEPHY COLE MD            Ot            440.20       
     ATHEROSCLEROSIS NATIVE ARTERIES EXTREMIT                     

 

 10/25/2017            STEPHY COLE MD            Ot            440.4        
    CHRONIC TOTAL OCCLUSION OF ARTERY OF THE                     

 

 10/25/2017            STEPHY COLE MD            Ot            729.5        
    PAIN IN LIMB                     

 

 10/25/2017            STEPHY COLE MD            Ot            V12.51       
     HX-VENOUS THROMBOSIS EMBOLISM                     

 

 10/25/2017            BRENDA BROWN MD            Ot            M25.572   
         PAIN IN LEFT ANKLE AND JOINTS OF LEFT FO                     

 

 10/25/2017            BRENDA BROWN MD            Ot            M79.672   
         PAIN IN LEFT FOOT                     

 

 10/25/2017            BRENDA BROWN MD            Ot            N63.11    
        UNSPECIFIED LUMP IN THE RIGHT BREAST, UP                     

 

 10/30/2017            BRENDA BROWN MD            Ot            K40.90    
        UNIL INGUINAL HERNIA, W/O OBST OR GANGR,                     

 

 10/30/2017            BRENDA BROWN MD            Ot            N36.9     
       URETHRAL DISORDER, UNSPECIFIED                     

 

 2017            BRENDA BROWN MD            Ot            K40.90    
        UNIL INGUINAL HERNIA, W/O OBST OR GANGR,                     

 

 2017            BRENDA BROWN MD            Ot            N36.9     
       URETHRAL DISORDER, UNSPECIFIED                     

 

 2017            TAWIL MD, ANGELA QUIÑONEZ            Ot            N20.0        
    CALCULUS OF KIDNEY                     

 

 2018            LINVICENTE N APRN            Ot            R05       
     COUGH                     

 

 2018                         Ot            285.9            ANEMIA NOS  
                   

 

 2018                         Ot            V16.3            FAMILY HX-
BREAST MALIG                     

 

 2018                         Ot            V16.41            FAM HX-MAL 
NEOP-OVARY                     

 

 2018                         Ot            V58.61            
ANTICOAGULANTS,LT,CURRENT USE                     

 

 2018                         Ot            786.50            CHEST PAIN 
NOS                     

 

 2018                         Ot            789.06            ABDOMINAL 
PAIN, EPIGASTRIC                     

 

 2018                         Ot            786.50            CHEST PAIN 
NOS                     

 

 2018                         Ot            789.06            ABDOMINAL 
PAIN, EPIGASTRIC                     

 

 2018                         Ot            V12.51            HX-VENOUS 
THROMBOSIS EMBOLISM                     

 

 2018                         Ot            V12.71            PERSONAL 
HISTORY OF PEPTIC ULCER DISEASE                     

 

 2018                         Ot            V58.69            OTH MED,LT,
CURRENT USE                     

 

 2018                         Ot            789.05            ABDOMINAL 
PAIN, PERIUMBILIC                     

 

 2018            DOUGLAS LARA, STEPHY CARDONA            Ot            244.9        
    HYPOTHYROIDISM NOS                     

 

 2018            DOUGLAS LARA, STEPHY CARDONA            Ot            336.9        
    SPINAL CORD DISEASE NOS                     

 

 2018            DOUGLAS LARA, STEPYH CARDONA            Ot            722.10       
     LUMBAR DISC DISPLACEMENT                     

 

 2018            DOUGLAS LARA, STEPHY CARDONA            Ot            722.11       
     THORACIC DISC DISPLACMNT                     

 

 2018            DOUGLAS LARA, STEPHY CARDONA            Ot            793.89       
     OTH (ABN) FINDINGS ON RADIOLOGICAL EXAMI                     

 

 2018            DOUGLAS LARA, STEPHY CARDONA            Ot            V16.3        
    FAMILY HX-BREAST MALIG                     

 

 2018            STEPHY COLE MD            Ot            244.9        
    HYPOTHYROIDISM NOS                     

 

 2018            DOUGLAS LARA, STEPHY CARDONA            Ot            729.5        
    PAIN IN LIMB                     

 

 2018            DOUGLAS LARA, STEPHY CARDONA            Ot            V12.51       
     HX-VENOUS THROMBOSIS EMBOLISM                     

 

 2018            STEPHY COLE MD            Ot            440.20       
     ATHEROSCLEROSIS NATIVE ARTERIES EXTREMIT                     

 

 2018            STEPHY COLE MD            Ot            440.4        
    CHRONIC TOTAL OCCLUSION OF ARTERY OF THE                     

 

 2018            STEPHY COLE MD            Ot            729.5        
    PAIN IN LIMB                     

 

 2018            STEPHY COLE MD            Ot            V12.51       
     HX-VENOUS THROMBOSIS EMBOLISM                     

 

 2018            STEPHANIE LARA, BRENDA TILLEY            Ot            M25.572   
         PAIN IN LEFT ANKLE AND JOINTS OF LEFT FO                     

 

 2018            BRENDA BROWN MD            Ot            M79.672   
         PAIN IN LEFT FOOT                     

 

 2018            BRENDA BROWN MD            Ot            N63.11    
        UNSPECIFIED LUMP IN THE RIGHT BREAST, UP                     

 

 2018            BRENDA BRWON MD            Ot            K40.90    
        UNIL INGUINAL HERNIA, W/O OBST OR GANGR,                     

 

 2018            BRENDA BROWN MD            Ot            N36.9     
       URETHRAL DISORDER, UNSPECIFIED                     

 

 2018            TAWIL MD, ANGELA QUIÑONEZ            Ot            N20.0        
    CALCULUS OF KIDNEY                     

 

 2018            VICENTE LIN N APRN            Ot            R05       
     COUGH                     

 

 2018            BRENDA BROWN MD            Ot            K21.9     
       GASTRO-ESOPHAGEAL REFLUX DISEASE WITHOUT                     

 

 2018            BRENDA BROWN MD            Ot            K22.4     
       DYSKINESIA OF ESOPHAGUS                     

 

 2018            BRENDA BROWN MD            Ot            K21.9     
       GASTRO-ESOPHAGEAL REFLUX DISEASE WITHOUT                     

 

 2018            BRENDA BROWN MD            Ot            K22.4     
       DYSKINESIA OF ESOPHAGUS                     

 

 2018                         Ot            786.50            CHEST PAIN 
NOS                     

 

 2018                         Ot            789.06            ABDOMINAL 
PAIN, EPIGASTRIC                     

 

 2018                         Ot            786.50            CHEST PAIN 
NOS                     

 

 2018                         Ot            789.06            ABDOMINAL 
PAIN, EPIGASTRIC                     

 

 2018                         Ot            V12.51            HX-VENOUS 
THROMBOSIS EMBOLISM                     

 

 2018                         Ot            V12.71            PERSONAL 
HISTORY OF PEPTIC ULCER DISEASE                     

 

 2018                         Ot            V58.69            OTH MED,LT,
CURRENT USE                     

 

 2018                         Ot            789.05            ABDOMINAL 
PAIN, PERIUMBILIC                     

 

 2018            DOUGLAS LARA, STEPHY CARDONA            Ot            244.9        
    HYPOTHYROIDISM NOS                     

 

 2018            STEPHY COLE MD            Ot            336.9        
    SPINAL CORD DISEASE NOS                     

 

 2018            STEPHY COLE MD            Ot            722.10       
     LUMBAR DISC DISPLACEMENT                     

 

 2018            STEPHY COLE MD            Ot            722.11       
     THORACIC DISC DISPLACMNT                     

 

 2018            STEPHY COLE MD            Ot            793.89       
     OT (ABN) FINDINGS ON RADIOLOGICAL EXAMI                     

 

 2018            STEPHY COLE MD            Ot            V16.3        
    FAMILY HX-BREAST MALIG                     

 

 2018            STEPHY COLE MD            Ot            244.9        
    HYPOTHYROIDISM NOS                     

 

 2018            STEPHY COLE MD            Ot            729.5        
    PAIN IN LIMB                     

 

 2018            STEPHY COLE MD            Ot            V12.51       
     HX-VENOUS THROMBOSIS EMBOLISM                     

 

 2018            STEPHY COLE MD            Ot            440.20       
     ATHEROSCLEROSIS NATIVE ARTERIES EXTREMIT                     

 

 2018            STEPHY COLE MD            Ot            440.4        
    CHRONIC TOTAL OCCLUSION OF ARTERY OF THE                     

 

 2018            STEPHY COLE MD            Ot            729.5        
    PAIN IN LIMB                     

 

 2018            STEPHY COLE MD            Ot            V12.51       
     HX-VENOUS THROMBOSIS EMBOLISM                     

 

 2018            BRENDA BROWN MD            Ot            M25.572   
         PAIN IN LEFT ANKLE AND JOINTS OF LEFT FO                     

 

 2018            BRENDA BROWN MD            Ot            M79.672   
         PAIN IN LEFT FOOT                     

 

 2018            BRENDA BROWN MD            Ot            N63.11    
        UNSPECIFIED LUMP IN THE RIGHT BREAST, UP                     

 

 2018            BRENDA BROWN MD            Ot            K40.90    
        UNIL INGUINAL HERNIA, W/O OBST OR GANGR,                     

 

 2018            BRENDA BROWN MD            Ot            N36.9     
       URETHRAL DISORDER, UNSPECIFIED                     

 

 2018            TAWIL MD, ANGELA QUIÑONEZ            Ot            N20.0        
    CALCULUS OF KIDNEY                     

 

 2018            VICENTE LIN N APRN            Ot            R05       
     COUGH                     

 

 2018            BRENDA BROWN MD            Ot            K21.9     
       GASTRO-ESOPHAGEAL REFLUX DISEASE WITHOUT                     

 

 2018            BRENDA BROWN MD            Ot            K22.4     
       DYSKINESIA OF ESOPHAGUS                     

 

 2018                         Ot            786.50            CHEST PAIN 
NOS                     

 

 2018                         Ot            789.06            ABDOMINAL 
PAIN, EPIGASTRIC                     

 

 2018                         Ot            786.50            CHEST PAIN 
NOS                     

 

 2018                         Ot            789.06            ABDOMINAL 
PAIN, EPIGASTRIC                     

 

 2018                         Ot            V12.51            HX-VENOUS 
THROMBOSIS EMBOLISM                     

 

 2018                         Ot            V12.71            PERSONAL 
HISTORY OF PEPTIC ULCER DISEASE                     

 

 2018                         Ot            V58.69            OTH MED,LT,
CURRENT USE                     

 

 2018                         Ot            789.05            ABDOMINAL 
PAIN, PERIUMBILIC                     

 

 2018            DOUGLAS LARA, STEPHY CARDONA            Ot            244.9        
    HYPOTHYROIDISM NOS                     

 

 2018            STEPHY COLE MD            Ot            336.9        
    SPINAL CORD DISEASE NOS                     

 

 2018            STEPHY COLE MD            Ot            722.10       
     LUMBAR DISC DISPLACEMENT                     

 

 2018            STEPHY COLE MD            Ot            722.11       
     THORACIC DISC DISPLACMNT                     

 

 2018            STEPHY COLE MD            Ot            793.89       
     OTH (ABN) FINDINGS ON RADIOLOGICAL EXAMI                     

 

 2018            STEPHY COLE MD            Ot            V16.3        
    FAMILY HX-BREAST MALIG                     

 

 2018            STEPHY COLE MD            Ot            244.9        
    HYPOTHYROIDISM NOS                     

 

 2018            STEPHY COLE MD            Ot            729.5        
    PAIN IN LIMB                     

 

 2018            STEPHY COLE MD            Ot            V12.51       
     HX-VENOUS THROMBOSIS EMBOLISM                     

 

 2018            STEPHY COLE MD            Ot            440.20       
     ATHEROSCLEROSIS NATIVE ARTERIES EXTREMIT                     

 

 2018            STEPHY COLE MD            Ot            440.4        
    CHRONIC TOTAL OCCLUSION OF ARTERY OF THE                     

 

 2018            STEPHY COLE MD            Ot            729.5        
    PAIN IN LIMB                     

 

 2018            STEPHY COLE MD            Ot            V12.51       
     HX-VENOUS THROMBOSIS EMBOLISM                     

 

 2018            STEPHANIE LARA, BRENDA TILLEY            Ot            M25.572   
         PAIN IN LEFT ANKLE AND JOINTS OF LEFT FO                     

 

 2018            BRENDA BROWN MD            Ot            M79.672   
         PAIN IN LEFT FOOT                     

 

 2018            BRENDA BROWN MD            Ot            N63.11    
        UNSPECIFIED LUMP IN THE RIGHT BREAST, UP                     

 

 2018            BRENDA BROWN MD            Ot            K40.90    
        UNIL INGUINAL HERNIA, W/O OBST OR GANGR,                     

 

 2018            BRENDA BROWN MD            Ot            N36.9     
       URETHRAL DISORDER, UNSPECIFIED                     

 

 2018            TAWIL MD, ANGELA QUIÑONEZ            Ot            N20.0        
    CALCULUS OF KIDNEY                     

 

 2018            VICENTE LIN N APRN            Ot            R05       
     COUGH                     

 

 2018            BRENDA BROWN MD            Ot            K21.9     
       GASTRO-ESOPHAGEAL REFLUX DISEASE WITHOUT                     

 

 2018            BRENDA BROWN MD            Ot            K22.4     
       DYSKINESIA OF ESOPHAGUS                     

 

 2018                         Ot            786.50            CHEST PAIN 
NOS                     

 

 2018                         Ot            789.06            ABDOMINAL 
PAIN, EPIGASTRIC                     

 

 2018                         Ot            786.50            CHEST PAIN 
NOS                     

 

 2018                         Ot            789.06            ABDOMINAL 
PAIN, EPIGASTRIC                     

 

 2018                         Ot            V12.51            HX-VENOUS 
THROMBOSIS EMBOLISM                     

 

 2018                         Ot            V12.71            PERSONAL 
HISTORY OF PEPTIC ULCER DISEASE                     

 

 2018                         Ot            V58.69            OTH MED,LT,
CURRENT USE                     

 

 2018                         Ot            789.05            ABDOMINAL 
PAIN, PERIUMBILIC                     

 

 2018            STEPHY COLE MD            Ot            244.9        
    HYPOTHYROIDISM NOS                     

 

 2018            STEPHY COLE MD            Ot            336.9        
    SPINAL CORD DISEASE NOS                     

 

 2018            STEPHY COLE MD            Ot            722.10       
     LUMBAR DISC DISPLACEMENT                     

 

 2018            STEPHY COLE MD            Ot            722.11       
     THORACIC DISC DISPLACMNT                     

 

 2018            STEPHY COLE MD            Ot            793.89       
     OTH (ABN) FINDINGS ON RADIOLOGICAL EXAMI                     

 

 2018            STEPHY COLE MD            Ot            V16.3        
    FAMILY HX-BREAST MALIG                     

 

 2018            STEPHY COLE MD            Ot            244.9        
    HYPOTHYROIDISM NOS                     

 

 2018            STEPHY COLE MD            Ot            729.5        
    PAIN IN LIMB                     

 

 2018            STEPHY COLE MD            Ot            V12.51       
     HX-VENOUS THROMBOSIS EMBOLISM                     

 

 2018            STEPHY COLE MD            Ot            440.20       
     ATHEROSCLEROSIS NATIVE ARTERIES EXTREMIT                     

 

 2018            STEPHY COLE MD            Ot            440.4        
    CHRONIC TOTAL OCCLUSION OF ARTERY OF THE                     

 

 2018            STEPHY COLE MD            Ot            729.5        
    PAIN IN LIMB                     

 

 2018            STEPHY COLE MD            Ot            V12.51       
     HX-VENOUS THROMBOSIS EMBOLISM                     

 

 2018            BRENDA BROWN MD            Ot            M25.572   
         PAIN IN LEFT ANKLE AND JOINTS OF LEFT FO                     

 

 2018            BRENDA BROWN MD            Ot            M79.672   
         PAIN IN LEFT FOOT                     

 

 2018            BRENDA BROWN MD            Ot            N63.11    
        UNSPECIFIED LUMP IN THE RIGHT BREAST, UP                     

 

 2018            BRENDA BROWN MD            Ot            K40.90    
        UNIL INGUINAL HERNIA, W/O OBST OR GANGR,                     

 

 2018            BRENDA BROWN MD            Ot            N36.9     
       URETHRAL DISORDER, UNSPECIFIED                     

 

 2018            TAWIL MD, ANGELA A            Ot            N20.0        
    CALCULUS OF KIDNEY                     

 

 2018            VICENTE LIN N APRN            Ot            R05       
     COUGH                     

 

 2018            BRENDA BROWN MD            Ot            K21.9     
       GASTRO-ESOPHAGEAL REFLUX DISEASE WITHOUT                     

 

 2018            BRENDA BROWN MD            Ot            K22.4     
       DYSKINESIA OF ESOPHAGUS                     

 

 2018            BRENDA BROWN MD            Ot            N63.11    
        UNSPECIFIED LUMP IN THE RIGHT BREAST, UP                     

 

 2018            BRENDA BROWN MD            Ot            N64.59    
        OTHER SIGNS AND SYMPTOMS IN BREAST                     

 

 2018            BRENDA BROWN MD            Ot            Z98.82    
        BREAST IMPLANT STATUS                     

 

 2018            BRENDA BROWN MD            Ot            N63.11    
        UNSPECIFIED LUMP IN THE RIGHT BREAST, UP                     

 

 2018            BRENDA BROWN MD            Ot            N64.59    
        OTHER SIGNS AND SYMPTOMS IN BREAST                     

 

 2018            BRENDA BROWN MD            Ot            Z98.82    
        BREAST IMPLANT STATUS                     

 

 2018            BRENDA BROWN MD            Ot            N63.11    
        UNSPECIFIED LUMP IN THE RIGHT BREAST, UP                     

 

 2018            BRENDA BROWN MD            Ot            N64.59    
        OTHER SIGNS AND SYMPTOMS IN BREAST                     

 

 2018            BRENDA BROWN MD            Ot            Z98.82    
        BREAST IMPLANT STATUS                     

 

 2018            MARINO KENNEDY MD            Ot            Z01.818       
     ENCOUNTER FOR OTHER PREPROCEDURAL EXAMIN                     

 

 2018            MARINO KENNEDY MD, Ot            Z01.818       
     ENCOUNTER FOR OTHER PREPROCEDURAL EXAMIN                     

 

 2018            MARINO KENNEDY MD            Ot            E06.3         
   AUTOIMMUNE THYROIDITIS                     

 

 2018            MARINO KENNEDY MD            Ot            E78.00        
    PURE HYPERCHOLESTEROLEMIA, UNSPECIFIED                     

 

 2018            MARINO KENNEDY MD            Ot            K21.0         
   GASTRO-ESOPHAGEAL REFLUX DISEASE WITH ES                     

 

 2018            MARINO KENNEDY MD            Ot            K29.70        
    GASTRITIS, UNSPECIFIED, WITHOUT BLEEDING                     

 

 2018            MARINO KENNEDY MD, Ot            K44.9         
   DIAPHRAGMATIC HERNIA WITHOUT OBSTRUCTION                     

 

 2018            MARINO KENNEDY MD            Ot            K64.1         
   SECOND DEGREE HEMORRHOIDS                     

 

 2018            MARINO KENNEDY MD            Ot            Z12.11        
    ENCOUNTER FOR SCREENING FOR MALIGNANT NE                     

 

 2018            MARINO KENNEDY MD, Ot            Z79.82        
    LONG TERM (CURRENT) USE OF ASPIRIN                     

 

 2018            MARINO KENNEDY MD            Ot            Z79.899       
     OTHER LONG TERM (CURRENT) DRUG THERAPY                     

 

 2018            MARINO KENNEDY MD, Ot            Z86.718       
     PERSONAL HISTORY OF OTHER VENOUS THROMBO                     

 

 2018            MARINO KENNEDY MD, Ot            E06.3         
   AUTOIMMUNE THYROIDITIS                     

 

 2018            MARINO KENNEDY MD            Ot            E78.00        
    PURE HYPERCHOLESTEROLEMIA, UNSPECIFIED                     

 

 2018            MARINO KENNEDY MD            Ot            K21.0         
   GASTRO-ESOPHAGEAL REFLUX DISEASE WITH ES                     

 

 2018            MARINO KENNEDY MD            Ot            K29.70        
    GASTRITIS, UNSPECIFIED, WITHOUT BLEEDING                     

 

 2018            MARINO KENNEDY MD            Ot            K44.9         
   DIAPHRAGMATIC HERNIA WITHOUT OBSTRUCTION                     

 

 2018            MARINO KENNEDY MD            Ot            K64.1         
   SECOND DEGREE HEMORRHOIDS                     

 

 2018            MARINO KENNEDY MD            Ot            Z12.11        
    ENCOUNTER FOR SCREENING FOR MALIGNANT NE                     

 

 2018            MARINO KENNEDY MD, Ot            Z79.82        
    LONG TERM (CURRENT) USE OF ASPIRIN                     

 

 2018            MARINO KENNEDY MD, Ot            Z79.899       
     OTHER LONG TERM (CURRENT) DRUG THERAPY                     

 

 2018            MARINO KENNEDY MD, Ot            Z86.718       
     PERSONAL HISTORY OF OTHER VENOUS THROMBO                     

 

 2018            DOUGLAS LARA, STEPHY CARDONA            Ot            336.9        
    SPINAL CORD DISEASE NOS                     

 

 2018            STEPHY COLE MD            Ot            722.10       
     LUMBAR DISC DISPLACEMENT                     

 

 2018            STEPHY COLE MD            Ot            722.11       
     THORACIC DISC DISPLACMNT                     

 

 2018            STEPHY COLE MD            Ot            793.89       
     OTH (ABN) FINDINGS ON RADIOLOGICAL EXAMI                     

 

 2018            STEPHY COLE MD            Ot            V16.3        
    FAMILY HX-BREAST MALIG                     

 

 2018            STEPHY COLE MD            Ot            244.9        
    HYPOTHYROIDISM NOS                     

 

 2018            STEPHY COLE MD            Ot            729.5        
    PAIN IN LIMB                     

 

 2018            STEPHY COLE MD            Ot            V12.51       
     HX-VENOUS THROMBOSIS EMBOLISM                     

 

 2018            STEPHY COLE MD            Ot            440.20       
     ATHEROSCLEROSIS NATIVE ARTERIES EXTREMIT                     

 

 2018            STEPHY COLE MD            Ot            440.4        
    CHRONIC TOTAL OCCLUSION OF ARTERY OF THE                     

 

 2018            STEPHY COLE MD            Ot            729.5        
    PAIN IN LIMB                     

 

 2018            DOUGLAS LARA, STEPHY CARDONA            Ot            V12.51       
     HX-VENOUS THROMBOSIS EMBOLISM                     

 

 2018            BRENDA BRONW MD            Ot            M25.572   
         PAIN IN LEFT ANKLE AND JOINTS OF LEFT FO                     

 

 2018            BRENDA BROWN MD, Ot            M79.672   
         PAIN IN LEFT FOOT                     

 

 2018            BRENDA BROWN MD            Ot            N63.11    
        UNSPECIFIED LUMP IN THE RIGHT BREAST, UP                     

 

 2018            BRENDA BROWN MD, Ot            K40.90    
        UNIL INGUINAL HERNIA, W/O OBST OR GANGR,                     

 

 2018            BRENDA BROWN MD, Ot            N36.9     
       URETHRAL DISORDER, UNSPECIFIED                     

 

 2018            TAWIL MD, ANGELA QUIÑONEZ            Ot            N20.0        
    CALCULUS OF KIDNEY                     

 

 2018            VICENTE LIN APRN            Ot            R05       
     COUGH                     

 

 2018            BRENDA BROWN MD, Ot            K21.9     
       GASTRO-ESOPHAGEAL REFLUX DISEASE WITHOUT                     

 

 2018            BRENDA BROWN MD, Ot            K22.4     
       DYSKINESIA OF ESOPHAGUS                     

 

 2018            BRENDA BROWN MD, Ot            N63.11    
        UNSPECIFIED LUMP IN THE RIGHT BREAST, UP                     

 

 2018            BRENDA BROWN MD            Ot            N64.59    
        OTHER SIGNS AND SYMPTOMS IN BREAST                     

 

 2018            BRENDA BROWN MD            Ot            Z98.82    
        BREAST IMPLANT STATUS                     

 

 2018            MARINO KENNEDY MD, Ot            Z01.818       
     ENCOUNTER FOR OTHER PREPROCEDURAL EXAMIN                     

 

 2018            MARINO KENNEDY MD            Ot            E06.3         
   AUTOIMMUNE THYROIDITIS                     

 

 2018            MARINO KENNEDY MD            Ot            F41.9         
   ANXIETY DISORDER, UNSPECIFIED                     

 

 2018            MARINO KENNEDY MD, Ot            G57.92        
    UNSPECIFIED MONONEUROPATHY OF LEFT LOWER                     

 

 2018            MARINO KENNEDY MD            Ot            K21.0         
   GASTRO-ESOPHAGEAL REFLUX DISEASE WITH ES                     

 

 2018            MARINO KENNEDY MD, Ot            K22.0         
   ACHALASIA OF CARDIA                     

 

 2018            MARINO KENNEDY MD            Ot            K29.70        
    GASTRITIS, UNSPECIFIED, WITHOUT BLEEDING                     

 

 2018            MARINO KENNEDY MD            Ot            K40.90        
    UNIL INGUINAL HERNIA, W/O OBST OR GANGR,                     

 

 2018            MARINO KENNEDY MD            Ot            K43.2         
   INCISIONAL HERNIA WITHOUT OBSTRUCTION OR                     

 

 2018            MARINO KENNEDY MD            Ot            K44.9         
   DIAPHRAGMATIC HERNIA WITHOUT OBSTRUCTION                     

 

 2018            MARINO KENNEDY MD            Ot            Z79.82        
    LONG TERM (CURRENT) USE OF ASPIRIN                     

 

 2018            MARINO KENNEDY MD            Ot            Z79.899       
     OTHER LONG TERM (CURRENT) DRUG THERAPY                     

 

 2018            MARINO KENNEDY MD            Ot            E06.3         
   AUTOIMMUNE THYROIDITIS                     

 

 2018            MARINO KENNEDY MD, Ot            F41.9         
   ANXIETY DISORDER, UNSPECIFIED                     

 

 2018            MARINO KENNEDY MD            Ot            G57.92        
    UNSPECIFIED MONONEUROPATHY OF LEFT LOWER                     

 

 2018            MARINO KENNEDY MD            Ot            K21.0         
   GASTRO-ESOPHAGEAL REFLUX DISEASE WITH ES                     

 

 2018            MARINO KENNEDY MD            Ot            K22.0         
   ACHALASIA OF CARDIA                     

 

 2018            MARINO KENNEDY MD            Ot            K29.70        
    GASTRITIS, UNSPECIFIED, WITHOUT BLEEDING                     

 

 2018            MARINO KENNEDY MD            Ot            K40.90        
    UNIL INGUINAL HERNIA, W/O OBST OR GANGR,                     

 

 2018            MARINO KENNEDY MD, Ot            K43.2         
   INCISIONAL HERNIA WITHOUT OBSTRUCTION OR                     

 

 2018            MARINO KENNEDY MD            Ot            K44.9         
   DIAPHRAGMATIC HERNIA WITHOUT OBSTRUCTION                     

 

 2018            MARINO KENNEDY MD            Ot            Z79.82        
    LONG TERM (CURRENT) USE OF ASPIRIN                     

 

 2018            MARINO KENNEDY MD            Ot            Z79.899       
     OTHER LONG TERM (CURRENT) DRUG THERAPY                     

 

 2018            STEPHY COLE MD            Ot            336.9        
    SPINAL CORD DISEASE NOS                     

 

 2018            STEPHY COLE MD            Ot            722.10       
     LUMBAR DISC DISPLACEMENT                     

 

 2018            STEPHY COLE MD            Ot            722.11       
     THORACIC DISC DISPLACMNT                     

 

 2018            STEPHY COLE MD            Ot            793.89       
     OTH (ABN) FINDINGS ON RADIOLOGICAL EXAMI                     

 

 2018            STEPHY COLE MD            Ot            V16.3        
    FAMILY HX-BREAST MALIG                     

 

 2018            STEPHY COLE MD            Ot            244.9        
    HYPOTHYROIDISM NOS                     

 

 2018            STEPHY COLE MD            Ot            729.5        
    PAIN IN LIMB                     

 

 2018            STEPHY COLE MD            Ot            V12.51       
     HX-VENOUS THROMBOSIS EMBOLISM                     

 

 2018            STEPHY COLE MD            Ot            440.20       
     ATHEROSCLEROSIS NATIVE ARTERIES EXTREMIT                     

 

 2018            STEPHY COLE MD            Ot            440.4        
    CHRONIC TOTAL OCCLUSION OF ARTERY OF THE                     

 

 2018            STEPHY COLE MD            Ot            729.5        
    PAIN IN LIMB                     

 

 2018            TSEPHY COLE MD            Ot            V12.51       
     HX-VENOUS THROMBOSIS EMBOLISM                     

 

 2018            BRENDA BROWN MD            Ot            M25.572   
         PAIN IN LEFT ANKLE AND JOINTS OF LEFT FO                     

 

 2018            BRENDA BROWN MD, Ot            M79.672   
         PAIN IN LEFT FOOT                     

 

 2018            BRENDA BROWN MD, Ot            N63.11    
        UNSPECIFIED LUMP IN THE RIGHT BREAST, UP                     

 

 2018            BRENDA BROWN MD, Ot            K40.90    
        UNIL INGUINAL HERNIA, W/O OBST OR GANGR,                     

 

 2018            BRENDA BROWN MD, Ot            N36.9     
       URETHRAL DISORDER, UNSPECIFIED                     

 

 2018            TAWIL MD, ANGELA QUIÑONEZ            Ot            N20.0        
    CALCULUS OF KIDNEY                     

 

 2018            VICENTE LIN N APRN            Ot            R05       
     COUGH                     

 

 2018            BRENDA BROWN MD, Ot            K21.9     
       GASTRO-ESOPHAGEAL REFLUX DISEASE WITHOUT                     

 

 2018            BRENDA BROWN MD, Ot            K22.4     
       DYSKINESIA OF ESOPHAGUS                     

 

 2018            BRENDA BROWN MD            Ot            N63.11    
        UNSPECIFIED LUMP IN THE RIGHT BREAST, UP                     

 

 2018            BRENDA BROWN MD            Ot            N64.59    
        OTHER SIGNS AND SYMPTOMS IN BREAST                     

 

 2018            BRENDA BROWN MD, Ot            Z98.82    
        BREAST IMPLANT STATUS                     

 

 2018            MARINO KENNEDY MD, Ot            E06.3         
   AUTOIMMUNE THYROIDITIS                     

 

 2018            MARINO KENNEDY MD, Ot            F41.9         
   ANXIETY DISORDER, UNSPECIFIED                     

 

 2018            MARINO KENNEDY MD, Ot            G57.92        
    UNSPECIFIED MONONEUROPATHY OF LEFT LOWER                     

 

 2018            MARINO KENNEDY MD, Ot            K21.0         
   GASTRO-ESOPHAGEAL REFLUX DISEASE WITH ES                     

 

 2018            MARINO KENNEDY MD, Ot            K22.0         
   ACHALASIA OF CARDIA                     

 

 2018            MARINO KENNEDY MD, Ot            K29.70        
    GASTRITIS, UNSPECIFIED, WITHOUT BLEEDING                     

 

 2018            MARINO KENNEDY MD, Ot            K40.90        
    UNIL INGUINAL HERNIA, W/O OBST OR GANGR,                     

 

 2018            MARINO KENNEDY MD, Ot            K43.2         
   INCISIONAL HERNIA WITHOUT OBSTRUCTION OR                     

 

 2018            MARINO KENNEDY MD, Ot            K44.9         
   DIAPHRAGMATIC HERNIA WITHOUT OBSTRUCTION                     

 

 2018            MARINO KENNEDY MD, Ot            Z79.82        
    LONG TERM (CURRENT) USE OF ASPIRIN                     

 

 2018            MARINO KENNEDY MD, Ot            Z79.899       
     OTHER LONG TERM (CURRENT) DRUG THERAPY                     

 

 2019            STEPHY COLE MD            Ot            793.89       
     OTH (ABN) FINDINGS ON RADIOLOGICAL EXAMI                     

 

 2019            STEPHY COLE MD            Ot            V16.3        
    FAMILY HX-BREAST MALIG                     

 

 2019            STEPHY COLE MD            Ot            244.9        
    HYPOTHYROIDISM NOS                     

 

 2019            STEPHY COLE MD            Ot            729.5        
    PAIN IN LIMB                     

 

 2019            STEPHY COLE MD            Ot            V12.51       
     HX-VENOUS THROMBOSIS EMBOLISM                     

 

 2019            STEPHY COLE MD            Ot            440.20       
     ATHEROSCLEROSIS NATIVE ARTERIES EXTREMIT                     

 

 2019            STEPHY COLE MD            Ot            440.4        
    CHRONIC TOTAL OCCLUSION OF ARTERY OF THE                     

 

 2019            STEPHY COLE MD            Ot            729.5        
    PAIN IN LIMB                     

 

 2019            STEPHY COLE MD            Ot            V12.51       
     HX-VENOUS THROMBOSIS EMBOLISM                     

 

 2019            BRENDA BROWN MD            Ot            M25.572   
         PAIN IN LEFT ANKLE AND JOINTS OF LEFT FO                     

 

 2019            BRENDA BROWN MD            Ot            M79.672   
         PAIN IN LEFT FOOT                     

 

 2019            BRENDA BROWN MD            Ot            N63.11    
        UNSPECIFIED LUMP IN THE RIGHT BREAST, UP                     

 

 2019            BRENDA BROWN MD            Ot            K40.90    
        UNIL INGUINAL HERNIA, W/O OBST OR GANGR,                     

 

 2019            BRENDA BROWN MD            Ot            N36.9     
       URETHRAL DISORDER, UNSPECIFIED                     

 

 2019            TAWIL MD, AGNELA QUIÑONEZ            Ot            N20.0        
    CALCULUS OF KIDNEY                     

 

 2019            VICENTE LIN N APRN            Ot            R05       
     COUGH                     

 

 2019            BRENDA BROWN MD            Ot            K21.9     
       GASTRO-ESOPHAGEAL REFLUX DISEASE WITHOUT                     

 

 2019            BRENDA BROWN MD            Ot            K22.4     
       DYSKINESIA OF ESOPHAGUS                     

 

 2019            BRENDA BROWN MD, Ot            N63.11    
        UNSPECIFIED LUMP IN THE RIGHT BREAST, UP                     

 

 2019            BRENDA BROWN MD            Ot            N64.59    
        OTHER SIGNS AND SYMPTOMS IN BREAST                     

 

 2019            BRENDA BROWN MD            Ot            Z98.82    
        BREAST IMPLANT STATUS                     

 

 2019            STEPHY COLE MD            Ot            793.89       
     OTH (ABN) FINDINGS ON RADIOLOGICAL EXAMI                     

 

 2019            STEPHY COLE MD            Ot            V16.3        
    FAMILY HX-BREAST MALIG                     

 

 2019            STEPHY COLE MD            Ot            244.9        
    HYPOTHYROIDISM NOS                     

 

 2019            STEPHY COLE MD            Ot            729.5        
    PAIN IN LIMB                     

 

 2019            STEPHY COLE MD            Ot            V12.51       
     HX-VENOUS THROMBOSIS EMBOLISM                     

 

 2019            STEPHY COLE MD            Ot            440.20       
     ATHEROSCLEROSIS NATIVE ARTERIES EXTREMIT                     

 

 2019            STEPHY COLE MD            Ot            440.4        
    CHRONIC TOTAL OCCLUSION OF ARTERY OF THE                     

 

 2019            STEPHY COLE MD            Ot            729.5        
    PAIN IN LIMB                     

 

 2019            STEPHY COLE MD            Ot            V12.51       
     HX-VENOUS THROMBOSIS EMBOLISM                     

 

 2019            BRENDA BROWN MD            Ot            M25.572   
         PAIN IN LEFT ANKLE AND JOINTS OF LEFT FO                     

 

 2019            BRENDA BROWN MD            Ot            M79.672   
         PAIN IN LEFT FOOT                     

 

 2019            BRENDA BROWN MD            Ot            N63.11    
        UNSPECIFIED LUMP IN THE RIGHT BREAST, UP                     

 

 2019            BRENDA BROWN MD            Ot            K40.90    
        UNIL INGUINAL HERNIA, W/O OBST OR GANGR,                     

 

 2019            BRENDA BROWN MD            Ot            N36.9     
       URETHRAL DISORDER, UNSPECIFIED                     

 

 2019            TAWIL MD, ANGELA QUIÑONEZ            Ot            N20.0        
    CALCULUS OF KIDNEY                     

 

 2019            VICENTE LIN APRN            Ot            R05       
     COUGH                     

 

 2019            BRENDA BROWN MD, Ot            K21.9     
       GASTRO-ESOPHAGEAL REFLUX DISEASE WITHOUT                     

 

 2019            BRENDA BROWN MD            Ot            K22.4     
       DYSKINESIA OF ESOPHAGUS                     

 

 2019            BRENDA BROWN MD            Ot            N63.11    
        UNSPECIFIED LUMP IN THE RIGHT BREAST, UP                     

 

 2019            BRENDA BROWN MD            Ot            N64.59    
        OTHER SIGNS AND SYMPTOMS IN BREAST                     

 

 2019            BRENDA BROWN MD            Ot            Z98.82    
        BREAST IMPLANT STATUS                     



                                                                               
                                                                               
                                                                               
                                                                               
                                                                               
                                                                               
                                                                               
                                                                               
                                                                               
                                                                               
                                                                               
                                                                               
                                                                               
                                                                               
                                                                               
                                                                               
                                                                               
                                                                               
                          



Procedures

      





 Code            Description            Performed By            Performed On   
     

 

             86.04                                  OTHER SKIN   SUBQ I   D    
                               2010        

 

             88.42                                  CONTRAST AORTOGRAM         
                          2010        

 

             88.48                                  CONTRAST ARTERIOGRAM-LEG   
                                2010        

 

             86.59                                  CLOSURE SKIN   SUBCUTANEOUS 
NEC                                   2010        

 

             38.91                                  ARTERIAL CATHETERIZATION   
                                2010        

 

             88.42                                  CONTRAST AORTOGRAM         
                          2010        

 

             88.48                                  CONTRAST ARTERIOGRAM-LEG   
                                2010        

 

             99.10                                  INJECT/INFUSE THROMBOLYTIC 
AGENT                                   2010        

 

             38.08                                  LOWER LIMB ARTERY INCIS    
                               2010        

 

             38.91                                  ARTERIAL CATHETERIZATION   
                                2010        

 

             38.93                                  VENOUS CATHETERIZATION NEC 
                                  2010        

 

             88.48                                  CONTRAST ARTERIOGRAM-LEG   
                                2010        

 

             99.10                                  INJECT/INFUSE THROMBOLYTIC 
AGENT                                   2010        



                                          



Results

      





 Test            Result            Range        









 Methicillin resistant Staphylococcus aureus (MRSA) screening culture -  09:31         









 Methicillin resistant Staphylococcus aureus (MRSA) screening culture          
  NEG             NRG        









 Complete blood count (CBC) with automated white blood cell (WBC) differential 
- 18 10:00         









 Blood leukocytes automated count (number/volume)            5.1 10*3/uL       
     4.3-11.0        

 

 Blood erythrocytes automated count (number/volume)            4.24 10*6/uL    
        4.35-5.85        

 

 Venous blood hemoglobin measurement (mass/volume)            12.7 g/dL        
    11.5-16.0        

 

 Blood hematocrit (volume fraction)            38 %            35-52        

 

 Automated erythrocyte mean corpuscular volume            90 [foz_us]          
  80-99        

 

 Automated erythrocyte mean corpuscular hemoglobin (mass per erythrocyte)      
      30 pg            25-34        

 

 Automated erythrocyte mean corpuscular hemoglobin concentration measurement (
mass/volume)            33 g/dL            32-36        

 

 Automated erythrocyte distribution width ratio            13.9 %            
10.0-14.5        

 

 Automated blood platelet count (count/volume)            284 10*3/uL          
  130-400        

 

 Automated blood platelet mean volume measurement            10.2 [foz_us]     
       7.4-10.4        

 

 Automated blood neutrophils/100 leukocytes            52 %            42-75   
     

 

 Automated blood lymphocytes/100 leukocytes            37 %            12-44   
     

 

 Blood monocytes/100 leukocytes            10 %            0-12        

 

 Automated blood eosinophils/100 leukocytes            1 %            0-10     
   

 

 Automated blood basophils/100 leukocytes            0 %            0-10        

 

 Blood neutrophils automated count (number/volume)            2.6 10*3         
   1.8-7.8        

 

 Blood lymphocytes automated count (number/volume)            1.9 10*3         
   1.0-4.0        

 

 Blood monocytes automated count (number/volume)            0.5 10*3            
0.0-1.0        

 

 Automated eosinophil count            0.1 10*3/uL            0.0-0.3        

 

 Automated blood basophil count (count/volume)            0.0 10*3/uL          
  0.0-0.1        



                  



Encounters

      





 ACCT No.            Visit Date/Time            Discharge            Status    
        Pt. Type            Provider            Facility            Loc./Unit  
          Complaint        

 

 [id number]            2018 09:06:00            2018 15:15:00    
        DIS            Outpatient            MARINO KENNEDY MD            [facility]            SDC            INCISIONAL HERNIA, LEFT 
INGUINAL HERNIA /DYSHAGIA        

 

 [id number]            2018 12:03:00            2018 12:43:00    
        DIS            Outpatient            MARINO KENNEDY MD            [facility] 
[facility]            PREOP            LEFT INGUINAL HERNIA/
EGD        

 

 [id number]            2018 09:01:00            2018 13:15:00    
        DIS            Outpatient            MARINO KENNEDY MD            [facility] 
[facility]            ENDO            SCREENING/EPIGASTRIC 
PAIN        

 

 [id number]            2018 05:52:00            2018 15:07:00    
        DIS            Outpatient            MARINO KENNEDY MD            [facility] 
[facility]            PREOP            COLONOSCOPY/EGD        

 

 [id number]            2018 07:54:00            2018 23:59:59    
        CLS            Outpatient            BRENDA BROWN MD            
[facility]            RAD            RT BREAST LUMP      
  

 

 [id number]            2018 10:51:00            2018 23:59:59    
        CLS            Outpatient            BRENDA BROWN MD            
[facility]            RAD            GASTROESOPHAGEAL 
REFLUX DISEASE WO ESOPHAGITIS        

 

 [id number]            2018 15:19:00            2018 23:59:59    
        CLS            Outpatient            VICENTE LIN            
[facility]            RAD            R05        

 

 [id number]            11/15/2017 12:41:00            11/15/2017 23:59:59    
        CLS            Outpatient            TAWIL MD, ELIAS A            [facility] 
[facility]            RAD            LT RENAL STONE        

 

 [id number]            10/27/2017 10:06:00            10/27/2017 23:59:59    
        CLS            Outpatient            BRENDA BROWN MD            
[facility]            RAD            R10.32 LEFT LOWER 
QUADRANT PAIN        

 

 [id number]            10/06/2017 08:11:00            10/06/2017 23:59:59    
        CLS            Outpatient            BRENDA BROWN MD            
[facility]            RAD            BREAST LUMP        

 

 [id number]            2017 11:48:00            2017 23:59:59    
        CLS            Outpatient            BRENDA BROWN MD            
[facility]            RAD            M79.672        

 

 [id number]            2017 10:31:00            2017 23:59:59    
        CLS            Outpatient            BRENDA BROWN MD            
[facility]            RAD            M25.572        

 

 [id number]            2015 12:27:00            2015 23:59:59    
        CLS            Outpatient            STEPHY COLE MD            [facility] 
[facility]            RAD            DVT        

 

 [id number]            2015 12:15:00            2015 23:59:59    
        CLS            Preadmit            STEPHY COLE MD            [facility] 
[facility]            RAD                     

 

 [id number]            2015 14:10:00            2015 23:59:59    
        CLS            Outpatient            STEPHY COLE MD            [facility]            RAD            LEFT UPPER LEG PAIN, HX 
OF DVT        

 

 [id number]            2014 13:31:00            2014 23:59:59    
        CLS            Outpatient            STEPHY COLE MD            [facility]            RAD            HYPOTHYROID, ENLARGED 
FIRM THYROID        

 

 [id number]            2014 22:35:00            2014 14:15:00    
        DIS            Inpatient            STEPHY COLE MD            [facility] 
[facility]            4TH            ANEMIA,HYPOKALEMIA,
INFLUENTZA-LIKE ILLNESS        

 

 [id number]            2013 13:34:00            2013 23:59:59    
        CLS            Outpatient            STEPHY COLE MD            [facility] 
[facility]            RAD            STRONG FAM HX OF BREAST 
CA, INVERTED NIPPLE, LUMP        

 

 [id number]            2013 14:30:00            10/08/2013 08:42:00    
        DIS            Outpatient            STEPHY COLE MD            [facility] 
[facility]            REHAB            NECK,UPPER BACK,LUMBAR 
PAIN,CERVICAL STENOSIS        

 

 [id number]            2013 14:37:00            2013 17:16:00    
        DIS            Emergency            RISA MCKEON MD            [facility] 
[facility]            ER            MULTIPLE COMPLAINTS      
  

 

 [id number]            2013 13:20:00            2013 23:59:59    
        CLS            Outpatient                                              
              

 

 [id number]            2013 08:06:00            2013 23:59:59    
        CLS            Outpatient            STEPHY COLE MD            Via 
[facility]            RAD            C SPINE UPPER BACK NECK 
CASSI, LOW BACK PAIN,        

 

 [id number]            2013 13:08:00            2013 23:59:59    
        CLS            Outpatient            STEPHY COLE MD            Via 
[facility]            RAD            HYPOTHYROIDISM        

 

 [id number]            2019 20:32:00                         ACT       
     Emergency            OSCAR LARA, CALLIE TILLEY            Via [facility]            ER            LOWER BACK PAIN        

 

 [id number]            2018 14:48:00                                   
   Document Registration                                                       
     

 

 [id number]            2018 14:48:00                                   
   Document Registration                                                       
     

 

 [id number]            2015 14:10:00                                   
   Document Registration                                                       
     

 

 [id number]            2015 14:10:00                                   
   Document Registration                                                       
     

 

 [id number]            2013 08:09:00                                   
   Document Registration                                                       
     

 

 [id number]            03/15/2013 10:09:00                                   
   Document Registration                                                       
     

 

 [id number]            2013 12:24:00                                   
   Document Registration                                                       
     

 

 [id number]            2012 00:00:00                                   
   Document Registration                                                       
     

 

 [id number]            2012 10:27:00                                   
   Document Registration                                                       
     

 

 [id number]            2012 08:34:00                                   
   Document Registration                                                       
     

 

 [id number]            2012 10:33:00                                   
   Document Registration                                                       
     

 

 [id number]            2012 08:43:00                                   
   Document Registration                                                       
     

 

 [id number]            2012 13:24:00                                   
   Document Registration                                                       
     

 

 [id number]            05/10/2012 10:11:00                                   
   Document Registration                                                       
     

 

 [id number]            2012 13:24:00                                   
   Document Registration                                                       
     

 

 [id number]            2012 05:39:00                                   
   Document Registration                                                       
     

 

 [id number]            2012 14:18:00                                   
   Document Registration                                                       
     

 

 [id number]            2012 11:54:00                                   
   Document Registration                                                       
     

 

 [id number]            2012 10:28:00                                   
   Document Registration                                                       
     

 

 [id number]            2011 09:14:00                                   
   Document Registration                                                       
     

 

 [id number]            2011 09:23:00                                   
   Document Registration                                                       
     

 

 [id number]            2011 10:43:00                                   
   Document Registration                                                       
     

 

 [id number]            2011 10:08:00                                   
   Document Registration                                                       
     

 

 [id number]            10/20/2010 09:13:00                                   
   Document Registration                                                       
     

 

 [id number]            10/07/2010 11:44:00                                   
   Document Registration                                                       
     

 

 [id number]            08/10/2010 14:00:00                                   
   Document Registration                                                       
     

 

 [id number]            2010 14:17:00                                   
   Document Registration                                                       
     

 

 [id number]            2010 10:08:00                                   
   Document Registration                                                       
     

 

 [id number]            2010 11:33:00                                   
   Document Registration                                                       
     

 

 [id number]            2010 05:43:00                                   
   Document Registration                                                       
     

 

 [id number]            2010 10:00:00                                   
   Document Registration                                                       
     

 

 [id number]            2010 10:14:00                                   
   Document Registration                                                       
     

 

 [id number]            2010 13:14:00                                   
   Document Registration                                                       
     

 

 [id number]            2010 09:22:00                                   
   Document Registration                                                       
     

 

 [id number]            2015 18:03:50                         ACT            
Document Registration

## 2019-04-22 NOTE — DIAGNOSTIC IMAGING REPORT
EXAM: AP view of the abdomen



INDICATION: Right flank pain with hematuria.



FINDINGS:  

Renal shadows are evident. There is no abnormal calcification

projecting over the renal shadows or evidence of an unexpected

stone along the course of the ureters. Calcification over the

right sacrum in correlation with prior CT appears to be a

vascular calcification. High density material within the urethra

is unchanged. This may relate to a ureteral diverticulum. 



The bowel gas pattern is nonobstructed. There are surgical clips

in right upper quadrant from cholecystectomy.



IMPRESSION:

1. Stones demonstrated within the left kidney on subsequent CT

are not readily apparent. The distal right UVJ stone is also

difficult to evaluate given the presence of some gas within the

rectum at that level. This appears to project just lateral to the

rectum.

2. Unchanged density at the level of the urethra may reflect

stones within the urethral diverticulum.

3. The bowel gas pattern appears nonobstructed.



Dictated by: 



  Dictated on workstation # [id number]

## 2019-04-23 ENCOUNTER — HOSPITAL ENCOUNTER (OUTPATIENT)
Dept: HOSPITAL 75 - PREOP | Age: 54
Discharge: HOME | End: 2019-04-23
Attending: UROLOGY
Payer: COMMERCIAL

## 2019-04-23 VITALS — HEIGHT: 62 IN | BODY MASS INDEX: 31.28 KG/M2 | WEIGHT: 170 LBS

## 2019-04-23 DIAGNOSIS — Z01.818: Primary | ICD-10-CM

## 2019-11-12 NOTE — DISCHARGE INST-SURGICAL
D/C Lap Instructions-BRENT


New, Converted, or Re-Newed RX:  RX on Chart


Follow Up Appt in 2 weeks





Activity as tolerated


No driving for 24 hours


No driving while on pain medications





sitz bath BID and PRN





Incentive Spirometry use every 2 hours while awake





Regular Diet





Symptoms to Report: Fever over 101 degree F, Nausea/Vomiting 


Infection Signs and Symptoms to report:  Increased redness, Foul odor of wound, 

Increased drainage





Bathing instructions: May shower


Operative Area Clean/Dry;  Keep incision clean/dry





If any problems/questions: Contact your physician or go to Emergency Room











MARINO KENNEDY MD Dec 21, 2018 09:53 Health Care Proxy (HCP)

## 2020-01-16 ENCOUNTER — HOSPITAL ENCOUNTER (OUTPATIENT)
Dept: HOSPITAL 75 - RAD | Age: 55
End: 2020-01-16
Attending: UROLOGY
Payer: COMMERCIAL

## 2020-01-16 DIAGNOSIS — N20.0: Primary | ICD-10-CM

## 2020-01-16 PROCEDURE — 74018 RADEX ABDOMEN 1 VIEW: CPT

## 2020-01-16 PROCEDURE — 74176 CT ABD & PELVIS W/O CONTRAST: CPT

## 2020-01-16 NOTE — DIAGNOSTIC IMAGING REPORT
PROCEDURE: CT urinary tract, rule out kidney stone.



TECHNIQUE: Multiple contiguous axial images were obtained through

the abdomen and pelvis without the use of intravenous contrast.

Auto Exposure Controls were utilized during the CT exam to meet

ALARA standards for radiation dose reduction. 



INDICATION: Right flank pain and hematuria.



COMPARISON: Correlation is made with prior CT from 04/22/2019.



FINDINGS: The lung bases are clear. The liver is unremarkable.

The gallbladder is surgically absent. No biliary ductal

dilatation is seen. The pancreas and spleen are unremarkable. No

adrenal mass is detected. Nonobstructing calculi in the left

kidney are noted, largest in the lower pole measuring 4 mm. No

definite right-sided renal calculi are detected. Both ureters are

normal in caliber. No ureteral or bladder calculi are seen. There

is no hydronephrosis. Aorta is calcified but nonaneurysmal. Bowel

loops are normal in caliber. There is no obstruction. No free

fluid or fluid collection is seen. Calcific density in the

midline of the low pelvis is again noted. This again could

represent urethral calcifications or perhaps stones within a

urethral diverticulum.



IMPRESSION:

1. Nonobstructing left renal calculus. No ureteral calculi or

hydronephrosis are detected.

2. Low midline pelvic calcifications, similar to prior CT, again

possibly representing ureteral or urethral diverticular stones.



Dictated by: 



  Dictated on workstation # [id number]

## 2020-01-16 NOTE — DIAGNOSTIC IMAGING REPORT
INDICATION: Right flank pain and hematuria.



Time of exam: 11:11 AM



Correlation is made with prior study from 04/24/2019.



Calcific density overlies lower pole left kidney. No definite

right-sided renal calculi are seen. No definite calculi along the

expected course of ureters are identified. There are some pelvic

calcifications particularly in the right hemipelvis, similar to

prior exam and may represent phleboliths. Bowel gas pattern is

unremarkable.



IMPRESSION: Probable left renal calculus. No definite ureteral

calculi are detected. 



Report was faxed to office of Dr. Tawil by canelo at 11:35 am.



Dictated by: 



  Dictated on workstation # [id number]

## 2020-02-12 ENCOUNTER — HOSPITAL ENCOUNTER (OUTPATIENT)
Dept: HOSPITAL 75 - RAD | Age: 55
End: 2020-02-12
Attending: NURSE PRACTITIONER
Payer: COMMERCIAL

## 2020-02-12 DIAGNOSIS — Z12.31: Primary | ICD-10-CM

## 2020-02-12 DIAGNOSIS — Z78.0: ICD-10-CM

## 2020-02-12 DIAGNOSIS — Z80.3: ICD-10-CM

## 2020-02-12 DIAGNOSIS — Z98.82: ICD-10-CM

## 2020-02-12 PROCEDURE — 77067 SCR MAMMO BI INCL CAD: CPT

## 2020-02-12 NOTE — DIAGNOSTIC IMAGING REPORT
INDICATION: Screening



The current study was also evaluated with a Computer Aided

Detection (CAD) system. 3-D Tomographic imaging was also

performed.



Comparison made with prior examination from  04/12/2018,

10/06/2017 and 11/12/2013



FINDINGS: There has been bilateral breast augmentation with

implants. There are some coarse calcifications as well as a

biopsy clip in the upper outer right breast. There is otherwise

scattered fibrotic densities. No new dominant mass lesion or

suspicious calcification identified. Both implants appear intact.



IMPRESSION: Category 2 benign



ACR BI-RADS Category 2: Benign findings.

Result letter will be mailed to the patient.

Note: At least 10% of breast cancer is not imaged by mammography.



Dictated by: 



  Dictated on workstation # [id number]

## 2020-03-11 ENCOUNTER — HOSPITAL ENCOUNTER (OUTPATIENT)
Dept: HOSPITAL 75 - RAD | Age: 55
End: 2020-03-11
Attending: NURSE PRACTITIONER
Payer: COMMERCIAL

## 2020-03-11 DIAGNOSIS — M47.26: ICD-10-CM

## 2020-03-11 DIAGNOSIS — M48.07: ICD-10-CM

## 2020-03-11 DIAGNOSIS — M51.17: Primary | ICD-10-CM

## 2020-03-11 DIAGNOSIS — M51.15: ICD-10-CM

## 2020-03-11 DIAGNOSIS — M48.05: ICD-10-CM

## 2020-03-11 PROCEDURE — 72148 MRI LUMBAR SPINE W/O DYE: CPT

## 2020-03-11 NOTE — DIAGNOSTIC IMAGING REPORT
EXAMINATION: MRI of the lumbar spine without contrast,

03/11/2020.



TECHNIQUE: Multiplanar, multisequence MRI of the lumbar spine was

performed without contrast.



INDICATION: Low back pain, chronic in nature.



COMPARISON: 07/16/2013.



FINDINGS:



Alignment of the spine is preserved. No fractures or subluxations

appreciated. The tip of the conus is unremarkable in appearance

and location.



T12-L1: There is a central disc protrusion. Bilateral facet and

ligamentum flavum hypertrophy is noted. Findings cause mild

central narrowing. Neural foramina demonstrate no significant

narrowing.



L1-L2: There is mild bilateral facet hypertrophy. No significant

central stenosis. The neural foramina demonstrate

mild-to-moderate bilateral narrowing.



L2-L3: There is bilateral facet and ligamentum flavum

hypertrophy. There is minimal broad-based bulging disc material

with no central stenosis. There is bilateral moderate neural

foraminal narrowing.



L3-L4: Bilateral facet and ligamentum flavum hypertrophy seen.

There is disc desiccation. No central stenosis. There is

bilateral moderate neural foraminal narrowing.



L4-L5: There is bilateral facet and ligamentum flavum

hypertrophy. There is disc desiccation. There is a right

paracentral broad-based bulging disc which extends into the right

neural foramen and lateral recess. Findings abut the exiting and

transiting nerve roots but without obvious encroachment.

Correlate with symptoms. Overall, there is mild central

narrowing. Bilateral moderate neural foraminal stenosis is seen.



L5-S1: There is intervertebral disc space narrowing, disc

desiccation, and a left paracentral broad-based bulging disc with

an annular tear. Bilateral facet and ligamentum flavum

hypertrophy. Moderate central stenosis with narrowing of the left

lateral recess and possible encroachment upon the transiting

nerve root; correlate with symptoms. Severe left and moderate

right neural foraminal stenosis is seen.



Visualized intra-abdominal structures are normal in appearance.



IMPRESSION:

1. Multilevel diffuse degenerative disease as described; see

above description.



Dictated by: 



  Dictated on workstation # [id number]

## 2020-12-01 ENCOUNTER — HOSPITAL ENCOUNTER (OUTPATIENT)
Dept: HOSPITAL 75 - PREOP | Age: 55
Discharge: HOME | End: 2020-12-01
Attending: SURGERY
Payer: COMMERCIAL

## 2020-12-01 VITALS — HEIGHT: 62.52 IN | BODY MASS INDEX: 36.79 KG/M2 | WEIGHT: 205.03 LBS

## 2020-12-01 DIAGNOSIS — Z01.812: Primary | ICD-10-CM

## 2020-12-01 DIAGNOSIS — Z87.19: ICD-10-CM

## 2020-12-01 DIAGNOSIS — Z20.828: ICD-10-CM

## 2020-12-01 PROCEDURE — 87635 SARS-COV-2 COVID-19 AMP PRB: CPT

## 2020-12-02 ENCOUNTER — HOSPITAL ENCOUNTER (OUTPATIENT)
Dept: HOSPITAL 75 - ENDO | Age: 55
Discharge: HOME | End: 2020-12-02
Attending: SURGERY
Payer: COMMERCIAL

## 2020-12-02 VITALS — DIASTOLIC BLOOD PRESSURE: 63 MMHG | SYSTOLIC BLOOD PRESSURE: 113 MMHG

## 2020-12-02 VITALS — DIASTOLIC BLOOD PRESSURE: 41 MMHG | SYSTOLIC BLOOD PRESSURE: 100 MMHG

## 2020-12-02 VITALS — SYSTOLIC BLOOD PRESSURE: 100 MMHG | DIASTOLIC BLOOD PRESSURE: 41 MMHG

## 2020-12-02 VITALS — HEIGHT: 62.6 IN | BODY MASS INDEX: 36.79 KG/M2 | WEIGHT: 205.03 LBS

## 2020-12-02 VITALS — DIASTOLIC BLOOD PRESSURE: 53 MMHG | SYSTOLIC BLOOD PRESSURE: 101 MMHG

## 2020-12-02 DIAGNOSIS — F41.9: ICD-10-CM

## 2020-12-02 DIAGNOSIS — G43.909: ICD-10-CM

## 2020-12-02 DIAGNOSIS — Z91.040: ICD-10-CM

## 2020-12-02 DIAGNOSIS — M54.5: ICD-10-CM

## 2020-12-02 DIAGNOSIS — K22.0: ICD-10-CM

## 2020-12-02 DIAGNOSIS — K31.89: ICD-10-CM

## 2020-12-02 DIAGNOSIS — G89.29: ICD-10-CM

## 2020-12-02 DIAGNOSIS — Z88.1: ICD-10-CM

## 2020-12-02 DIAGNOSIS — Z79.899: ICD-10-CM

## 2020-12-02 DIAGNOSIS — J45.909: ICD-10-CM

## 2020-12-02 DIAGNOSIS — M19.90: ICD-10-CM

## 2020-12-02 DIAGNOSIS — K21.00: Primary | ICD-10-CM

## 2020-12-02 DIAGNOSIS — Z88.0: ICD-10-CM

## 2020-12-02 DIAGNOSIS — F32.9: ICD-10-CM

## 2020-12-02 DIAGNOSIS — K44.9: ICD-10-CM

## 2020-12-02 DIAGNOSIS — E03.9: ICD-10-CM

## 2020-12-02 DIAGNOSIS — Z87.19: ICD-10-CM

## 2020-12-02 DIAGNOSIS — K21.9: ICD-10-CM

## 2020-12-02 DIAGNOSIS — K29.70: ICD-10-CM

## 2020-12-02 NOTE — DISCHARGE INST-SURGICAL
D/C Lap Instructions-BRENT


Follow Up





Activity as tolerated








High Fiber Diet 25g or more per day





Avoid Alcohol, Caffeine, Spicy Greasy and Acid foods.





Drink 64 fluid oz or more of fluids per day.





Symptoms to Report: Fever over 101 degree F, Nausea/Vomiting 


If any problems/questions: Contact your physician or go to Emergency Room











MARINO KENNEDY MD                 Dec 2, 2020 10:07

## 2020-12-02 NOTE — PROGRESS NOTE-POST OPERATIVE
Post-Operative Progess Note


Surgeon (s)/Assistant (s)


Surgeon


MARINO KENNEDY MD


Assistant:  none





Pre-Operative Diagnosis


GERD, hx barretts, dysphagia





Post-Operative Diagnosis





reflux esophagitis(stage 2), achalasia, small HH(1.5cm), mild-mod


gastritis.





Procedure & Operative Findings


Date of Procedure


12/2/20


Procedure Performed/Findings


EGD with bx and dilatation.


Anesthesia Type


mac





Estimated Blood Loss


Estimated blood loss (mL):  minimal





Specimens/Packing


Specimens Removed


ge jxn, antrum











MARINO KENNEDY MD                 Dec 2, 2020 11:55

## 2020-12-02 NOTE — OPERATIVE REPORT
DATE OF SERVICE:  12/02/2020



ATTENDING PRIMARY CARE PHYSICIAN:

Michael Díaz MD



PREOPERATIVE DIAGNOSES:

History of Flowers's esophagus, gastroesophageal reflux disease and achalasia.



POSTOPERATIVE DIAGNOSES:

Reflux esophagitis stage II, hypertonic lower esophageal sphincter.  Small

hiatal hernia 1.5 cm in size, mild to moderate gastritis.  No distal

obstructions.



PROCEDURE:

EGD with biopsy and balloon dilatation.



SURGEON:

Marino Kennedy MD.



ANESTHESIA:

Monitored anesthesia care.



ESTIMATED BLOOD LOSS:

Minimal.



FINDINGS:

Reflux esophagitis stage II, hypertonic lower esophageal sphincter.  Small

hiatal hernia 1.5 cm in size, mild to moderate gastritis.  No distal

obstructions.



DISPOSITION:

The patient tolerated the procedure well.



INDICATIONS:

The patient is a 55-year-old female who has had a longstanding history of

gastroesophageal reflux disease.  She was placed on Protonix, which has helped

her symptoms.  She also later developed dysphagia on an intermittent basis and

did undergo EGD and dilatation.  We then had her undergo an esophageal manometry

study, which did show hypertonic lower esophageal sphincter consistent with

achalasia.  At this time, she states that her symptoms of dysphagia are

intermittent and not severe and for the most part is able to tolerate most foods

intake and adequate amounts of liquids.



DESCRIPTION OF PROCEDURE:

The patient was brought to the endoscopy suite, laid in left lateral decubitus

position with head slightly elevated.  After adequate IV pain and stated

medications and monitored anesthesia care, the mouthpiece was applied.



The endoscope was placed in the mouth, visualizing the pharynx and

hypopharyngeal region.  Vocal cords, epiglottis and vallecula identified and

appeared to be normal.  Endoscope was then gently abated esophageal opening and

esophagus insufflated.  The endoscope was then advanced through the first,

second and third portion of the esophagus at the level of the GE junction, a

reflux esophagitis stage II identified.  There was also a hypertonic lower

esophageal sphincter tone consistent with achalasia.  A biopsy was taken of the

GE junction with forceps for visualization of good hemostasis.



The endoscope was then advanced in the stomach and endoscope retroflexed,

visualizing a small hiatal hernia approximately 1.5 cm in size.  There was also

a mild to moderate gastritis.  No formal ulcerations, polyps, or any neoplasms. 

A biopsy was taken of the antrum to rule out H. pylori with visualization of

good hemostasis.  Endoscope was then advanced through the pylorus and the first

and second portion of the duodenum, which appeared normal with no distal

obstructions.



We then proceeded with balloon dilatation of the lower esophageal sphincter. 

The balloon was placed in the stomach and pulled back to the area of the lower

esophageal sphincter.  We then first proceeded to 2, 4, then 6 atmospheres of

pressure with mild to moderate resistance.  We then left this in place for 60

seconds.  The balloon was then desufflated and removed with visualization of

good hemostasis as well as no mucosal tears.  The endoscope was then slowly

withdrawn while taking a second look and suctioning of residual air with no

additional findings.



The patient tolerated the procedure well.  We will recommend continued medical

management with the necessary lifestyle and diet accommodation and conservative

therapy.  We will recommend small and more frequent meals, avoidance of eating

at night as well as head elevation while lying supine.  We will also recommend

chewing thoroughly and eating slowly.  If she does have recurrent episodes of

worsening dysphagia, we will have her followup and proceed with a dilatation

with achalasia balloon and increase the diameter in a staged manner.





Job ID: [id number]

DocumentID: [id number]

Dictated Date:  12/02/2020 12:09:29

Transcription Date: 12/02/2020 20:07:10

Dictated By: MARINO KENNEDY MD

## 2020-12-02 NOTE — PROGRESS NOTE-PRE OPERATIVE
Pre-Operative Progress Note


H&P Reviewed


The H&P was reviewed, patient examined and no changes noted.


Date Seen by Provider:  Dec 2, 2020


Time Seen by Provider:  10:00


Date H&P Reviewed:  Dec 2, 2020


Time H&P Reviewed:  10:00


Pre-Operative Diagnosis:  GERD, hx MARINO Ragsdale MD                 Dec 2, 2020 10:06

## 2020-12-02 NOTE — CONSCIOUS SEDATION/ASA
Conscious Sedation Pre-Proced


Time


10:00





ASA Score


2


For ASA 3 and 4: Consider anesthesia and medical clearance. Also, for patients

with a history of failed moderate sedation consider anesthesia.

















Airway 


 


Lungs 


 


Heart 


 


 ASA score


 


 ASA 1: a normal healthy patient


 


 ASA 2:  a patient with a mild systemic disease (mid diabetes, controlled 

hypertension, obesity 


 


 ASA 3:  a patient with a severe systemic disease that limits activity  (angina,

COPD, prior Myocardial infarction)


 


 ASA 4:  a patient with an incapacitating disease that is a constant threat to 

life (CHF, renal failure)


 


 ASA 5:  a moribund patient not expected to survive 24 hrs.  (ruptured aneurysm)


 


 ASA 6:  a declared brain-dead patient whose organs are being harvested.


 


 For emergent operations, add the letter E after the classification











Mallampati Classification


Grade 2





Sedation Plan


Analgesia, Amnesia, Plan communicated to team members, Discussed options with 

patient/fam, Discussed risks with patient/fam


The patient is an appropriate candidate to undergo the planned procedure, 

sedation, and anesthesia.





The patient immediately re-assessed prior to indication.











MARINO KENNEDY MD                 Dec 2, 2020 10:05

## 2020-12-08 NOTE — ANESTHESIA-GENERAL POST-OP
MAC


Significant Intra-Op Events


Notes


postop addendum for mac anesthesia on 12/2/20 at 1200





Patient Condition


Mental Status/LOC:  Same as Preop


Cardiovascular:  Satisfactory


Nausea/Vomiting:  Absent


Respiratory:  Satisfactory


Pain:  Controlled


Complications:  Absent





Post Op Complications


Complications


None





Follow Up Care/Instructions


Patient Instructions


None needed.





Anesthesiology Discharge Order


Discharge Order


Patient is doing well, no complaints, stable vital signs, no apparent adverse 

anesthesia problems.   


No complications reported per nursing.











RIAN ZAPATA CRNA               Dec 8, 2020 07:10

## 2021-06-22 ENCOUNTER — HOSPITAL ENCOUNTER (OUTPATIENT)
Dept: HOSPITAL 75 - RAD | Age: 56
End: 2021-06-22
Attending: FAMILY MEDICINE
Payer: COMMERCIAL

## 2021-06-22 DIAGNOSIS — Z12.31: Primary | ICD-10-CM

## 2021-06-22 PROCEDURE — 77067 SCR MAMMO BI INCL CAD: CPT

## 2021-06-22 PROCEDURE — 77063 BREAST TOMOSYNTHESIS BI: CPT

## 2021-06-22 NOTE — DIAGNOSTIC IMAGING REPORT
INDICATION: Routine screening.



COMPARISON is made with prior mammograms from 2/12/2020 and

10/6/2017.



2-D and 3-D bilateral screening mammography was performed with

CAD.



Bilateral subpectoral breast implants are noted. Implant contours

remain smooth. Scattered fibroglandular densities are noted in

both breasts. Coarse calcifications in the outer right breast are

again noted and appear unchanged. No mass or malignant appearing

microcalcifications are seen. Axillae are unremarkable.



IMPRESSION: BI-RADS Category 2



No mammographic features suspicious for malignancy are

identified.



ACR BI-RADS Category 2: Benign findings.

Result letter will be mailed to the patient.

Note: At least 10% of breast cancer is not imaged by mammography.



Dictated by: 



  Dictated on workstation # [id number]

## 2022-06-24 ENCOUNTER — HOSPITAL ENCOUNTER (OUTPATIENT)
Dept: HOSPITAL 75 - RAD | Age: 57
End: 2022-06-24
Attending: NURSE PRACTITIONER
Payer: COMMERCIAL

## 2022-06-24 DIAGNOSIS — Z12.31: Primary | ICD-10-CM

## 2022-06-24 PROCEDURE — 77063 BREAST TOMOSYNTHESIS BI: CPT

## 2022-06-24 PROCEDURE — 77067 SCR MAMMO BI INCL CAD: CPT

## 2022-06-24 NOTE — DIAGNOSTIC IMAGING REPORT
INDICATION: Routine screening.



CORRELATION is made with prior mammograms from 6/22/2021 and

2/12/2020.



2-D and 3-D bilateral screening mammography was performed with

CAD.



Bilateral subpectoral breast implants are again noted. Implant

contours remain smooth without evidence of extracapsular rupture.

Scattered fibroglandular densities in both breasts are noted.

Previously noted coarse calcifications in the outer right breast

are again noted and appear stable. No discrete mass or

malignant-appearing microcalcifications are seen. Axillae are

unremarkable.



IMPRESSION: BI-RADS Category 2



No mammographic features suspicious for malignancy are

identified.



ACR BI-RADS Category 2: Benign findings.

Result letter will be mailed to the patient.

Note: At least 10% of breast cancer is not imaged by mammography.



Dictated by: 



  Dictated on workstation # [id number]

## 2023-01-12 ENCOUNTER — HOSPITAL ENCOUNTER (OUTPATIENT)
Dept: HOSPITAL 75 - CARD | Age: 58
End: 2023-01-12
Attending: NURSE PRACTITIONER
Payer: COMMERCIAL

## 2023-01-12 DIAGNOSIS — R01.1: Primary | ICD-10-CM

## 2023-01-12 PROCEDURE — 93306 TTE W/DOPPLER COMPLETE: CPT
